# Patient Record
Sex: MALE | Race: WHITE | Employment: OTHER | URBAN - METROPOLITAN AREA
[De-identification: names, ages, dates, MRNs, and addresses within clinical notes are randomized per-mention and may not be internally consistent; named-entity substitution may affect disease eponyms.]

---

## 2022-08-29 ENCOUNTER — OFFICE VISIT (OUTPATIENT)
Dept: NEUROSURGERY | Age: 72
End: 2022-08-29
Payer: MEDICARE

## 2022-08-29 VITALS
DIASTOLIC BLOOD PRESSURE: 57 MMHG | WEIGHT: 315 LBS | RESPIRATION RATE: 18 BRPM | OXYGEN SATURATION: 98 % | HEART RATE: 68 BPM | HEIGHT: 70 IN | TEMPERATURE: 98.1 F | BODY MASS INDEX: 45.1 KG/M2 | SYSTOLIC BLOOD PRESSURE: 135 MMHG

## 2022-08-29 DIAGNOSIS — M54.12 CERVICAL RADICULOPATHY: ICD-10-CM

## 2022-08-29 DIAGNOSIS — M48.062 SPINAL STENOSIS OF LUMBAR REGION WITH NEUROGENIC CLAUDICATION: Primary | ICD-10-CM

## 2022-08-29 PROCEDURE — 4004F PT TOBACCO SCREEN RCVD TLK: CPT | Performed by: PHYSICIAN ASSISTANT

## 2022-08-29 PROCEDURE — 99204 OFFICE O/P NEW MOD 45 MIN: CPT | Performed by: PHYSICIAN ASSISTANT

## 2022-08-29 PROCEDURE — 1123F ACP DISCUSS/DSCN MKR DOCD: CPT | Performed by: PHYSICIAN ASSISTANT

## 2022-08-29 PROCEDURE — G8419 CALC BMI OUT NRM PARAM NOF/U: HCPCS | Performed by: PHYSICIAN ASSISTANT

## 2022-08-29 PROCEDURE — G8427 DOCREV CUR MEDS BY ELIG CLIN: HCPCS | Performed by: PHYSICIAN ASSISTANT

## 2022-08-29 PROCEDURE — 3017F COLORECTAL CA SCREEN DOC REV: CPT | Performed by: PHYSICIAN ASSISTANT

## 2022-08-29 PROCEDURE — 99202 OFFICE O/P NEW SF 15 MIN: CPT

## 2022-08-29 RX ORDER — PREDNISOLONE ACETATE 10 MG/ML
SUSPENSION/ DROPS OPHTHALMIC
COMMUNITY
Start: 2022-08-10

## 2022-08-29 RX ORDER — CITALOPRAM 20 MG/1
TABLET ORAL
COMMUNITY
Start: 2022-08-04

## 2022-08-29 RX ORDER — ALLOPURINOL 300 MG/1
TABLET ORAL
COMMUNITY
Start: 2022-08-04

## 2022-08-29 RX ORDER — ATENOLOL 50 MG/1
TABLET ORAL
COMMUNITY
Start: 2022-08-02

## 2022-08-29 RX ORDER — TRIAMCINOLONE ACETONIDE 1 MG/G
CREAM TOPICAL
COMMUNITY
Start: 2022-07-25

## 2022-08-29 RX ORDER — SODIUM FLUORIDE AND POTASSIUM NITRATE 5.8; 57.5 MG/ML; MG/ML
GEL, DENTIFRICE DENTAL
COMMUNITY
Start: 2022-07-25

## 2022-08-29 RX ORDER — GABAPENTIN 300 MG/1
CAPSULE ORAL
COMMUNITY
Start: 2022-08-04

## 2022-08-29 RX ORDER — ATORVASTATIN CALCIUM 40 MG/1
TABLET, FILM COATED ORAL
COMMUNITY
Start: 2022-06-21

## 2022-08-29 RX ORDER — LOSARTAN POTASSIUM AND HYDROCHLOROTHIAZIDE 25; 100 MG/1; MG/1
TABLET ORAL
COMMUNITY
Start: 2022-07-02

## 2022-08-29 ASSESSMENT — ENCOUNTER SYMPTOMS
SHORTNESS OF BREATH: 0
PHOTOPHOBIA: 0
TROUBLE SWALLOWING: 0
ABDOMINAL PAIN: 0
BACK PAIN: 1

## 2022-08-29 NOTE — PROGRESS NOTES
Subjective:      Patient ID: Ioana Garcia is a 81 y.o. male. Kimberly Rodriguez is a 81year old male with a past medical history of Afib for which he takes Eliquis, obesity with BMI 46, previous lumbar laminectomy in 1999, and spinal cord stimulator placement in 2018. SCS is MRI compatible. He presents to the office today as a new patient c/o low back pain with radiation down both legs, right greater than left. Describes the pain as a dull ache and intermittently sharp, shooting pain. Pain has been present for several years, but has recently been worsening. Walking and standing for long periods of time makes the pain worse. Ambulates with a cane. He has tried gabapentin, physical therapy, and received lumbar JEWELL by Dr. Viviana Ramirez for which his leg pain has improved. Patient is also c/o neck pain with radiation into his arms. Numbness is also noted in both hands. Denies loss of bowel or bladder, saddle anesthesia, headache, loss of dexterity, abnormal gait, fever, chills, N/V, SOB, or chest pain. Off note, pt chews tobacco daily. MRI lumbar spine 7/21/21 report only states moderate to severe spinal canal stenosis and foraminal stenosis from L2-S1. Review of Systems   Constitutional:  Negative for fever and unexpected weight change. HENT:  Negative for trouble swallowing. Eyes:  Negative for photophobia and visual disturbance. Respiratory:  Negative for shortness of breath. Cardiovascular:  Negative for chest pain. Gastrointestinal:  Negative for abdominal pain. Endocrine: Negative for heat intolerance. Genitourinary:  Negative for flank pain. Musculoskeletal:  Positive for arthralgias, back pain, gait problem and neck pain. Negative for myalgias. Skin:  Negative for wound. Neurological:  Positive for weakness and numbness. Negative for headaches. Psychiatric/Behavioral:  Negative for confusion.       Objective:   Physical Exam  Constitutional:       Appearance: He is well-developed. He is obese. HENT:      Head: Normocephalic and atraumatic. Eyes:      Extraocular Movements: Extraocular movements intact. Conjunctiva/sclera: Conjunctivae normal.      Pupils: Pupils are equal, round, and reactive to light. Neck:      Comments: Pain with ROM and palpation of cervical spine  Cardiovascular:      Rate and Rhythm: Normal rate. Pulmonary:      Effort: Pulmonary effort is normal.   Abdominal:      General: There is no distension. Musculoskeletal:      Cervical back: Neck supple. Comments: Diffuse tenderness to palpation of lumbar spine   Skin:     General: Skin is warm and dry. Neurological:      Mental Status: He is alert. Comments: Alert and oriented x3  CN3-12 intact  BLE 4/5 strength   BUE 5/5  Sensation intact to light touch  No Hoffmans   Psychiatric:         Thought Content: Thought content normal.       Assessment: This is a 81year old male presenting for low back pain, leg pain, and neck pain. MRI from 2021 report only states severe stenosis from L2-S1. Plan:      -Obtain updated MRI cervical and lumbar spine. Pt states he will need under anesthesia. SCS is compatible.  Rep will be notified prior to MRI.  -Tobacco cessation  -Weight loss  -Continue pain management  -OARRS report reviewed   -Return to Neurosurgery clinic after completion of imaging to discuss results and further treatment plan  -Call/return sooner if symptoms worsen or new issues arise in the interim           Sirisha Salinas PA-C

## 2022-09-01 ENCOUNTER — TELEPHONE (OUTPATIENT)
Dept: NEUROSURGERY | Age: 72
End: 2022-09-01

## 2022-09-01 NOTE — TELEPHONE ENCOUNTER
Patient's wife called regarding orders for MRI. It shows we ordered cervical spine and lumbar spine on 8/29/22 by Ashley Lagunas. She wants to see if we need to order thoracic also.

## 2022-09-01 NOTE — TELEPHONE ENCOUNTER
No need for MRI thoracic spine. I was only going to order lumbar spine (since that's what he presented for) but was also having neck pain/arm pain. I do not feel he is having any thoracic issues.

## 2022-09-12 ENCOUNTER — TELEPHONE (OUTPATIENT)
Dept: NEUROSURGERY | Age: 72
End: 2022-09-12

## 2022-09-12 DIAGNOSIS — F41.9 ANXIETY: Primary | ICD-10-CM

## 2022-09-12 RX ORDER — DIAZEPAM 5 MG/1
5 TABLET ORAL ONCE
Qty: 2 TABLET | Refills: 0 | Status: SHIPPED | OUTPATIENT
Start: 2022-09-12 | End: 2022-09-12

## 2022-09-12 NOTE — TELEPHONE ENCOUNTER
Patient has to go to mobile MRI. He wants medication to relax him. He is not able to go to regular MRI and be sedated. He says it is due to his size.   [phone]

## 2022-10-04 ENCOUNTER — TELEPHONE (OUTPATIENT)
Dept: NEUROSURGERY | Age: 72
End: 2022-10-04

## 2022-10-04 DIAGNOSIS — F06.4 ANXIETY DISORDER DUE TO KNOWN PHYSIOLOGICAL CONDITION: Primary | ICD-10-CM

## 2022-10-04 RX ORDER — DIAZEPAM 10 MG/1
10 TABLET ORAL ONCE
Qty: 1 TABLET | Refills: 0 | Status: SHIPPED | OUTPATIENT
Start: 2022-10-04 | End: 2022-10-04

## 2022-10-04 NOTE — TELEPHONE ENCOUNTER
Patient is having a Mri on 10/13/22 and needs a rx for medication to help relax him. He prefers Valium. [facility]  in [place] inside of [facility].

## 2022-10-10 ENCOUNTER — HOSPITAL ENCOUNTER (OUTPATIENT)
Dept: MRI IMAGING | Age: 72
Discharge: HOME | End: 2022-10-12

## 2022-10-10 DIAGNOSIS — M48.062 SPINAL STENOSIS OF LUMBAR REGION WITH NEUROGENIC CLAUDICATION: ICD-10-CM

## 2022-10-26 ENCOUNTER — TELEPHONE (OUTPATIENT)
Dept: NEUROSURGERY | Age: 72
End: 2022-10-26

## 2022-10-26 DIAGNOSIS — F41.9 ANXIETY: Primary | ICD-10-CM

## 2022-10-26 RX ORDER — DIAZEPAM 5 MG/1
5 TABLET ORAL ONCE
Qty: 1 TABLET | Refills: 0 | Status: SHIPPED | OUTPATIENT
Start: 2022-10-26 | End: 2022-10-26

## 2022-10-26 NOTE — TELEPHONE ENCOUNTER
Patient contacted office to advise that he is scheduled for his MRI's on 11.03.2022 however they have him scheduled for the mobile due to his weight and they will not be doing it under sedation; patient is requesting medication.   Please advise

## 2022-11-03 ENCOUNTER — HOSPITAL ENCOUNTER (OUTPATIENT)
Dept: MRI IMAGING | Age: 72
Discharge: HOME | End: 2022-11-05
Payer: MEDICARE

## 2022-11-03 DIAGNOSIS — M54.12 CERVICAL RADICULOPATHY: ICD-10-CM

## 2022-11-03 PROCEDURE — 72141 MRI NECK SPINE W/O DYE: CPT

## 2022-11-04 DIAGNOSIS — M48.062 SPINAL STENOSIS OF LUMBAR REGION WITH NEUROGENIC CLAUDICATION: ICD-10-CM

## 2022-11-04 DIAGNOSIS — F40.240 CLAUSTROPHOBIA: Primary | ICD-10-CM

## 2022-11-04 DIAGNOSIS — Z98.890 HX OF DECOMPRESSIVE LUMBAR LAMINECTOMY: ICD-10-CM

## 2022-11-04 RX ORDER — DIAZEPAM 5 MG/1
5 TABLET ORAL
Qty: 1 TABLET | Refills: 0 | Status: SHIPPED | OUTPATIENT
Start: 2022-11-04 | End: 2022-11-05

## 2022-12-15 ENCOUNTER — HOSPITAL ENCOUNTER (OUTPATIENT)
Dept: MRI IMAGING | Age: 72
Discharge: HOME | End: 2022-12-17
Payer: MEDICARE

## 2022-12-15 DIAGNOSIS — Z98.890 HX OF DECOMPRESSIVE LUMBAR LAMINECTOMY: ICD-10-CM

## 2022-12-15 DIAGNOSIS — M48.062 SPINAL STENOSIS OF LUMBAR REGION WITH NEUROGENIC CLAUDICATION: ICD-10-CM

## 2022-12-15 PROCEDURE — 72148 MRI LUMBAR SPINE W/O DYE: CPT

## 2022-12-27 ENCOUNTER — OFFICE VISIT (OUTPATIENT)
Dept: NEUROSURGERY | Age: 72
End: 2022-12-27
Payer: MEDICARE

## 2022-12-27 VITALS
HEART RATE: 75 BPM | RESPIRATION RATE: 18 BRPM | DIASTOLIC BLOOD PRESSURE: 62 MMHG | HEIGHT: 70 IN | BODY MASS INDEX: 45.1 KG/M2 | SYSTOLIC BLOOD PRESSURE: 126 MMHG | OXYGEN SATURATION: 98 % | WEIGHT: 315 LBS | TEMPERATURE: 98.2 F

## 2022-12-27 DIAGNOSIS — M48.062 SPINAL STENOSIS OF LUMBAR REGION WITH NEUROGENIC CLAUDICATION: Primary | ICD-10-CM

## 2022-12-27 DIAGNOSIS — M54.12 CERVICAL RADICULOPATHY: ICD-10-CM

## 2022-12-27 PROCEDURE — 99213 OFFICE O/P EST LOW 20 MIN: CPT | Performed by: PHYSICIAN ASSISTANT

## 2022-12-27 PROCEDURE — G8417 CALC BMI ABV UP PARAM F/U: HCPCS | Performed by: PHYSICIAN ASSISTANT

## 2022-12-27 PROCEDURE — 1123F ACP DISCUSS/DSCN MKR DOCD: CPT | Performed by: PHYSICIAN ASSISTANT

## 2022-12-27 PROCEDURE — G8484 FLU IMMUNIZE NO ADMIN: HCPCS | Performed by: PHYSICIAN ASSISTANT

## 2022-12-27 PROCEDURE — 4004F PT TOBACCO SCREEN RCVD TLK: CPT | Performed by: PHYSICIAN ASSISTANT

## 2022-12-27 PROCEDURE — 99212 OFFICE O/P EST SF 10 MIN: CPT

## 2022-12-27 PROCEDURE — 3017F COLORECTAL CA SCREEN DOC REV: CPT | Performed by: PHYSICIAN ASSISTANT

## 2022-12-27 PROCEDURE — G8427 DOCREV CUR MEDS BY ELIG CLIN: HCPCS | Performed by: PHYSICIAN ASSISTANT

## 2022-12-27 NOTE — PROGRESS NOTES
Office Follow-up     Subjective: Pb Olivier is a 81year old male with a past medical history of Afib for which he takes Eliquis, obesity with BMI 46, previous lumbar laminectomy in 1999, and spinal cord stimulator placement in 2018. SCS is MRI compatible. He initially presented to the office 8/29/22 c/o low back pain with radiation down both legs, right greater than left. Describes the pain as a dull ache and intermittently sharp, shooting pain. Pain has been present for several years, but has recently been worsening. Walking and standing for long periods of time makes the pain worse. Ambulates with a cane. He has tried gabapentin, physical therapy, and received lumbar JEWELL by Dr. Vivi Randall for which his leg pain has improved. Patient is also c/o neck pain with radiation into his arms. Numbness is also noted in both hands. Updated MRIs ordered at that time. Patient presents to the office today to review imaging. Symptoms remain the same, no better or worse. Denies loss of bowel or bladder, saddle anesthesia, headache, loss of dexterity, abnormal gait, fever, chills, N/V, SOB, or chest pain. Off note, pt chews tobacco daily. Physical Exam:              WDWN, no apparent distress              Non-labored breathing               Vitals Stable              Alert and oriented x3              CN 3-12 intact              PERRL              EOMI              BLE 4/5 strength   BUE 5/5              Sensation to LT intact bilaterally   Pain with ROM and palpation of cervical spine   Diffuse tenderness to palpation of lumbar spine                 Imaging: MRI lumbar spine 12/15/22:   Impression   1. No fracture or bony destructive lesion.    2. Degenerative changes superimposed on a developmentally narrow central   canal result in severe central canal stenosis at L3-4 and moderate stenoses   at L2-3 and L5-S1.   3.  Multilevel neural foraminal stenoses, worst (severe) at the left L3-4,   left L4-5 and left L5-S1 levels. 4. Marrow edema in the right L4-5 facet and the right L4 pedicle likely to   arthrosis. Posteriorly projecting synovial cyst arising from the right L4-5   facet joint. 5. Status post hemilaminectomies at L4-5. MRI cervical spine 11/3/22:   Impression   1. No fracture or bony destructive lesion. 2. Disc bulges and disc osteophyte complexes at multiple levels, coupled with   a central disc extrusion at C5-6.   3. Moderate central canal stenosis at C5-6. Mild stenoses at C3-4 and C4-5.   4.  Multilevel neural foraminal stenoses, worst (moderate to severe) at the   left C3-4 level. Moderate neural foraminal stenoses at multiple levels. Assessment: This is a 81 y.o.  male presenting for a follow-up to review imaging. Plan:  -MRIs reviewed and discussed in detail with patient. He continues to see pain management but feel the injections are no longer helping  -Tobacco cessation  -Lumbar flex/ex XR ordered  -Weight loss  -Once patient is 2-3 weeks tobacco free, call our office to make appointment with Dr. Talib Laguerre to discuss surgical intervention   -OARRS report reviewed   -Call or return to neurosurgery office sooner if symptoms worsen or if new issues arise in the interim.     Electronically signed by Juan José Noonan PA-C on 12/27/2022 at 4:08 PM

## 2023-08-18 ENCOUNTER — HOSPITAL ENCOUNTER (EMERGENCY)
Age: 73
Discharge: HOME | End: 2023-08-18
Payer: MEDICARE

## 2023-08-18 VITALS
HEART RATE: 108 BPM | SYSTOLIC BLOOD PRESSURE: 117 MMHG | RESPIRATION RATE: 18 BRPM | OXYGEN SATURATION: 95 % | DIASTOLIC BLOOD PRESSURE: 50 MMHG | TEMPERATURE: 98.6 F | WEIGHT: 300 LBS | BODY MASS INDEX: 43.05 KG/M2

## 2023-08-18 DIAGNOSIS — M25.559 HIP PAIN, UNSPECIFIED LATERALITY: ICD-10-CM

## 2023-08-18 DIAGNOSIS — S61.218A LACERATION OF LITTLE FINGER WITHOUT FOREIGN BODY WITHOUT DAMAGE TO NAIL, UNSPECIFIED LATERALITY, INITIAL ENCOUNTER: Primary | ICD-10-CM

## 2023-08-18 PROCEDURE — 99211 OFF/OP EST MAY X REQ PHY/QHP: CPT

## 2023-08-18 PROCEDURE — 6360000002 HC RX W HCPCS: Performed by: NURSE PRACTITIONER

## 2023-08-18 PROCEDURE — 90471 IMMUNIZATION ADMIN: CPT | Performed by: NURSE PRACTITIONER

## 2023-08-18 PROCEDURE — 90715 TDAP VACCINE 7 YRS/> IM: CPT | Performed by: NURSE PRACTITIONER

## 2023-08-18 RX ORDER — CEPHALEXIN 500 MG/1
500 CAPSULE ORAL 4 TIMES DAILY
Qty: 20 CAPSULE | Refills: 0 | Status: SHIPPED | OUTPATIENT
Start: 2023-08-18 | End: 2023-08-23

## 2023-08-18 RX ORDER — TRAMADOL HYDROCHLORIDE 50 MG/1
50 TABLET ORAL EVERY 6 HOURS PRN
Qty: 12 TABLET | Refills: 0 | Status: SHIPPED | OUTPATIENT
Start: 2023-08-18 | End: 2023-08-21

## 2023-08-18 RX ORDER — HYDROCHLOROTHIAZIDE 25 MG/1
25 TABLET ORAL DAILY
COMMUNITY

## 2023-08-18 RX ADMIN — TETANUS TOXOID, REDUCED DIPHTHERIA TOXOID AND ACELLULAR PERTUSSIS VACCINE, ADSORBED 0.5 ML: 5; 2.5; 8; 8; 2.5 SUSPENSION INTRAMUSCULAR at 12:34

## 2023-08-18 ASSESSMENT — PAIN SCALES - GENERAL: PAINLEVEL_OUTOF10: 8

## 2023-08-18 ASSESSMENT — PAIN - FUNCTIONAL ASSESSMENT: PAIN_FUNCTIONAL_ASSESSMENT: 0-10

## 2023-08-18 NOTE — ED PROVIDER NOTES
Department of Emergency  [facility] [address]   Provider Note  Admit Date/Time: 2023 11:57 AM  Room:   NAME: Heaven Grace  : 1950  MRN: [id number]     Chief Complaint:  Fall (Pt stated that he fell yesterday. Laceration noted to left pinky finger. Pt stated that he is having increase back pain. )    History of Present Illness        Heaven Grace is a 72 y.o. male who has a past medical history of:   Past Medical History:   Diagnosis Date    Arthritis     Atrial fibrillation ([address])     COPD (chronic obstructive pulmonary disease) ([address])     Depression     Gout     Hyperlipidemia     Hypertension     presents to the urgent care center by private car for evaluation. He fell yesterday has a laceration to his left fifth finger and also pain in the right buttock. He has chronic back problems those are unchanged his wife said. Not complaining of any pain over the lumbar spine he said it is more in the right buttock. He is on an anticoagulant but the last time he had an EKG he was not in A- fib and so he did stop taking the eliquis yesterday. Denies hitting his head he denies any neck pain. He said he has chronic pain in his back and legs and that is unchanged and he has difficulty with ambulation and getting around because of his pain he missed a step when he was going down the steps and fell yesterday. ROS    Pertinent positives and negatives are stated within HPI, all other systems reviewed and are negative. History reviewed. No pertinent surgical history. Social History:  reports that he quit smoking about 3 years ago. His smoking use included cigarettes. He has a 54.00 pack-year smoking history. His smokeless tobacco use includes chew. He reports current alcohol use of about 6.0 standard drinks per week. He reports that he does not use drugs. Family History: family history is not on file.   Allergies: Protonix [pantoprazole]    Physical Exam   Oxygen happened yesterday there is no repair that can be done he does have some swelling of the fleshly part of the pad. He also did not want that x-rayed. He was given a tetanus shot since he did not know when his last one was. I did irrigate the wound with normal saline will close it with Gelfoam and a dressing. I advised him to leave that in place for 2 or 3 days and he is on Eliquis if there is any bleeding through the dressing they need to go to the ED but the wife said it has not really been bleeding a lot. I did put him on some Keflex. He denies that there was any head injury he denies any headache or neck pain. I did advise follow-up with his Dr for a wound check. His heart rate initially was 108 when he came in when I auscultated his lung sounds he was regular and the recheck heart rate was 94. He does not have any chest pain or shortness of breath. Assessment      1. Laceration of little finger without foreign body without damage to nail, unspecified laterality, initial encounter    2. Hip pain, unspecified laterality      Plan   Discharge to home and advised to contact Talia Anderson MD  [facility] [phone]    Schedule an appointment as soon as possible for a visit   For wound re-check   Patient condition is good    New Medications     New Prescriptions    CEPHALEXIN (KEFLEX) 500 MG CAPSULE    Take 1 capsule by mouth 4 times daily for 5 days     Electronically signed by BRIELLE Brush CNP   DD: 8/18/23  **This report was transcribed using voice recognition software. Every effort was made to ensure accuracy; however, inadvertent computerized transcription errors may be present.   END OF ED PROVIDER NOTE       BRIELLE Brush CNP  08/18/23 1240

## 2023-12-29 ENCOUNTER — HOSPITAL ENCOUNTER (EMERGENCY)
Age: 73
Discharge: HOME | End: 2023-12-29
Payer: MEDICARE

## 2023-12-29 VITALS
WEIGHT: 285 LBS | BODY MASS INDEX: 40.89 KG/M2 | SYSTOLIC BLOOD PRESSURE: 142 MMHG | RESPIRATION RATE: 20 BRPM | DIASTOLIC BLOOD PRESSURE: 69 MMHG | TEMPERATURE: 98.1 F | HEART RATE: 71 BPM | OXYGEN SATURATION: 100 %

## 2023-12-29 DIAGNOSIS — H10.9 BACTERIAL CONJUNCTIVITIS OF LEFT EYE: Primary | ICD-10-CM

## 2023-12-29 PROCEDURE — 99211 OFF/OP EST MAY X REQ PHY/QHP: CPT

## 2023-12-29 RX ORDER — CIPROFLOXACIN HYDROCHLORIDE 3.5 MG/ML
1 SOLUTION/ DROPS TOPICAL
Qty: 10 ML | Refills: 0 | Status: SHIPPED | OUTPATIENT
Start: 2023-12-29 | End: 2024-01-08

## 2023-12-29 ASSESSMENT — PAIN DESCRIPTION - LOCATION: LOCATION: EYE

## 2023-12-29 ASSESSMENT — PAIN - FUNCTIONAL ASSESSMENT: PAIN_FUNCTIONAL_ASSESSMENT: 0-10

## 2023-12-29 ASSESSMENT — PAIN DESCRIPTION - ORIENTATION: ORIENTATION: LEFT

## 2023-12-29 ASSESSMENT — PAIN SCALES - GENERAL: PAINLEVEL_OUTOF10: 4

## 2023-12-29 ASSESSMENT — PAIN DESCRIPTION - DESCRIPTORS: DESCRIPTORS: DISCOMFORT

## 2023-12-29 NOTE — DISCHARGE INSTRUCTIONS
Cipro eyedrops 1 drop to the left eye every 2 hours while awake. Please follow-up with ophthalmology.

## 2024-06-21 ENCOUNTER — HOSPITAL ENCOUNTER (EMERGENCY)
Age: 74
Discharge: HOME | End: 2024-06-21
Payer: MEDICARE

## 2024-06-21 VITALS
TEMPERATURE: 99 F | RESPIRATION RATE: 20 BRPM | DIASTOLIC BLOOD PRESSURE: 57 MMHG | BODY MASS INDEX: 40.89 KG/M2 | WEIGHT: 285 LBS | OXYGEN SATURATION: 98 % | HEART RATE: 77 BPM | SYSTOLIC BLOOD PRESSURE: 118 MMHG

## 2024-06-21 DIAGNOSIS — M25.50 ARTHRALGIA, UNSPECIFIED JOINT: Primary | ICD-10-CM

## 2024-06-21 PROCEDURE — 99211 OFF/OP EST MAY X REQ PHY/QHP: CPT

## 2024-06-21 PROCEDURE — 6360000002 HC RX W HCPCS: Performed by: PHYSICIAN ASSISTANT

## 2024-06-21 RX ORDER — TRAMADOL HYDROCHLORIDE 50 MG/1
50 TABLET ORAL EVERY 6 HOURS PRN
COMMUNITY

## 2024-06-21 RX ORDER — METHOCARBAMOL 750 MG/1
750 TABLET, FILM COATED ORAL 4 TIMES DAILY
COMMUNITY

## 2024-06-21 RX ORDER — TRIAMCINOLONE ACETONIDE 40 MG/ML
40 INJECTION, SUSPENSION INTRA-ARTICULAR; INTRAMUSCULAR ONCE
Status: COMPLETED | OUTPATIENT
Start: 2024-06-21 | End: 2024-06-21

## 2024-06-21 RX ORDER — PREDNISONE 20 MG/1
TABLET ORAL
Qty: 6 TABLET | Refills: 0 | Status: SHIPPED | OUTPATIENT
Start: 2024-06-22

## 2024-06-21 RX ADMIN — TRIAMCINOLONE ACETONIDE 40 MG: 40 INJECTION, SUSPENSION INTRA-ARTICULAR; INTRAMUSCULAR at 14:48

## 2024-06-21 NOTE — ED PROVIDER NOTES
[facility]RGENT [facility]ARE  EMERGENCY DEPARTMENT ENCOUNTER        NAME: Isaias Sierra  :  1950  MRN:  [id number]  Date of evaluation: 2024  Provider: Teodoro Camacho PA-C  PCP: Miranda Ramos MD  Note Started : 2:36 PM EDT 24    Chief Complaint: Ankle Pain (Gout flare up, right foot/ankle) and Gout (Started last night)      This is a 74-year-old male that presents to urgent care complaining of right ankle and right knee pain for the past couple days.  He has a history of gout he is on allopurinol.  He denies any recent injury.  No numbness or tingling.  On first contact patient he appears to be in no acute distress.        Review of Systems  Pertinent positives and negatives are stated within HPI, all other systems reviewed and are negative.     Allergies: Protonix [pantoprazole]     --------------------------------------------- PAST HISTORY ---------------------------------------------  Past Medical History:  has a past medical history of Arthritis, Atrial fibrillation (HCC), COPD (chronic obstructive pulmonary disease) (HCC), Depression, Gout, Hyperlipidemia, and Hypertension.    Past Surgical History:  has a past surgical history that includes back surgery.    Social History:  reports that he quit smoking about 4 years ago. His smoking use included cigarettes. He started smoking about 58 years ago. He has a 54.0 pack-year smoking history. His smokeless tobacco use includes chew. He reports current alcohol use of about 6.0 standard drinks of alcohol per week. He reports that he does not use drugs.    Family History: family history is not on file.     The patient’s home medications have been reviewed.    The nursing notes within the ED encounter have been reviewed.     ------------------------------------------------SCREENINGS----------------------------------------------                        CIWA Assessment  BP: (!) 118/57  Pulse: 77        (Please note that portions of this note were completed with a voice recognition program.  Efforts were made to edit the dictations but occasionally words are mis-transcribed.)    --------------------------------- IMPRESSION AND DISPOSITION ---------------------------------    IMPRESSION  1. Arthralgia, unspecified joint        DISPOSITION Decision To Discharge 06/21/2024 02:45:27 PM    Disposition: Discharge to home  Patient condition is good    I am the Primary Clinician of Record.     Teodoro Camacho PA-C (electronically signed) on 6/21/2024 at 2:50 PM         Teodoro Camacho PA-C  06/21/24 1450

## 2024-07-06 ENCOUNTER — APPOINTMENT (OUTPATIENT)
Dept: CT IMAGING | Age: 74
End: 2024-07-06
Attending: STUDENT IN AN ORGANIZED HEALTH CARE EDUCATION/TRAINING PROGRAM
Payer: MEDICARE

## 2024-07-06 ENCOUNTER — APPOINTMENT (OUTPATIENT)
Dept: GENERAL RADIOLOGY | Age: 74
End: 2024-07-06
Payer: MEDICARE

## 2024-07-06 ENCOUNTER — HOSPITAL ENCOUNTER (EMERGENCY)
Age: 74
Discharge: HOME | End: 2024-07-06
Attending: STUDENT IN AN ORGANIZED HEALTH CARE EDUCATION/TRAINING PROGRAM
Payer: MEDICARE

## 2024-07-06 VITALS
SYSTOLIC BLOOD PRESSURE: 142 MMHG | TEMPERATURE: 98.3 F | HEART RATE: 83 BPM | OXYGEN SATURATION: 97 % | DIASTOLIC BLOOD PRESSURE: 66 MMHG | RESPIRATION RATE: 18 BRPM

## 2024-07-06 DIAGNOSIS — M54.31 SCIATICA OF RIGHT SIDE: Primary | ICD-10-CM

## 2024-07-06 LAB
ALBUMIN SERPL-MCNC: 4 G/DL (ref 3.5–5.2)
ALP SERPL-CCNC: 62 U/L (ref 40–129)
ALT SERPL-CCNC: 18 U/L (ref 0–40)
ANION GAP SERPL CALCULATED.3IONS-SCNC: 10 MMOL/L (ref 7–16)
AST SERPL-CCNC: 20 U/L (ref 0–39)
BASOPHILS # BLD: 0.02 K/UL (ref 0–0.2)
BASOPHILS NFR BLD: 0 % (ref 0–2)
BILIRUB SERPL-MCNC: 0.4 MG/DL (ref 0–1.2)
BUN SERPL-MCNC: 24 MG/DL (ref 6–23)
CALCIUM SERPL-MCNC: 9.1 MG/DL (ref 8.6–10.2)
CHLORIDE SERPL-SCNC: 105 MMOL/L (ref 98–107)
CO2 SERPL-SCNC: 23 MMOL/L (ref 22–29)
CREAT SERPL-MCNC: 1 MG/DL (ref 0.7–1.2)
EOSINOPHIL # BLD: 0.34 K/UL (ref 0.05–0.5)
EOSINOPHILS RELATIVE PERCENT: 4 % (ref 0–6)
ERYTHROCYTE [DISTWIDTH] IN BLOOD BY AUTOMATED COUNT: 13.7 % (ref 11.5–15)
ERYTHROCYTE [SEDIMENTATION RATE] IN BLOOD BY WESTERGREN METHOD: 42 MM/HR (ref 0–15)
GFR, ESTIMATED: 78 ML/MIN/1.73M2
GLUCOSE SERPL-MCNC: 129 MG/DL (ref 74–99)
HCT VFR BLD AUTO: 38.8 % (ref 37–54)
HGB BLD-MCNC: 12.1 G/DL (ref 12.5–16.5)
IMM GRANULOCYTES # BLD AUTO: <0.03 K/UL (ref 0–0.58)
IMM GRANULOCYTES NFR BLD: 0 % (ref 0–5)
LYMPHOCYTES NFR BLD: 1.53 K/UL (ref 1.5–4)
LYMPHOCYTES RELATIVE PERCENT: 20 % (ref 20–42)
MCH RBC QN AUTO: 28.7 PG (ref 26–35)
MCHC RBC AUTO-ENTMCNC: 31.2 G/DL (ref 32–34.5)
MCV RBC AUTO: 91.9 FL (ref 80–99.9)
MONOCYTES NFR BLD: 0.72 K/UL (ref 0.1–0.95)
MONOCYTES NFR BLD: 9 % (ref 2–12)
NEUTROPHILS NFR BLD: 66 % (ref 43–80)
NEUTS SEG NFR BLD: 5.08 K/UL (ref 1.8–7.3)
PLATELET # BLD AUTO: 267 K/UL (ref 130–450)
PMV BLD AUTO: 10.5 FL (ref 7–12)
POTASSIUM SERPL-SCNC: 4.3 MMOL/L (ref 3.5–5)
PROT SERPL-MCNC: 7.8 G/DL (ref 6.4–8.3)
RBC # BLD AUTO: 4.22 M/UL (ref 3.8–5.8)
SODIUM SERPL-SCNC: 138 MMOL/L (ref 132–146)
WBC OTHER # BLD: 7.7 K/UL (ref 4.5–11.5)

## 2024-07-06 PROCEDURE — 96372 THER/PROPH/DIAG INJ SC/IM: CPT

## 2024-07-06 PROCEDURE — 85652 RBC SED RATE AUTOMATED: CPT

## 2024-07-06 PROCEDURE — 73502 X-RAY EXAM HIP UNI 2-3 VIEWS: CPT

## 2024-07-06 PROCEDURE — 80053 COMPREHEN METABOLIC PANEL: CPT

## 2024-07-06 PROCEDURE — 6370000000 HC RX 637 (ALT 250 FOR IP): Performed by: STUDENT IN AN ORGANIZED HEALTH CARE EDUCATION/TRAINING PROGRAM

## 2024-07-06 PROCEDURE — 6360000002 HC RX W HCPCS: Performed by: STUDENT IN AN ORGANIZED HEALTH CARE EDUCATION/TRAINING PROGRAM

## 2024-07-06 PROCEDURE — 72131 CT LUMBAR SPINE W/O DYE: CPT

## 2024-07-06 PROCEDURE — 96374 THER/PROPH/DIAG INJ IV PUSH: CPT

## 2024-07-06 PROCEDURE — 99284 EMERGENCY DEPT VISIT MOD MDM: CPT

## 2024-07-06 PROCEDURE — 85025 COMPLETE CBC W/AUTO DIFF WBC: CPT

## 2024-07-06 RX ORDER — DIAZEPAM 5 MG/1
5 TABLET ORAL ONCE
Status: COMPLETED | OUTPATIENT
Start: 2024-07-06 | End: 2024-07-06

## 2024-07-06 RX ORDER — MORPHINE SULFATE 4 MG/ML
4 INJECTION, SOLUTION INTRAMUSCULAR; INTRAVENOUS ONCE
Status: DISCONTINUED | OUTPATIENT
Start: 2024-07-06 | End: 2024-07-06

## 2024-07-06 RX ORDER — METHOCARBAMOL 500 MG/1
500 TABLET, FILM COATED ORAL 4 TIMES DAILY
Qty: 40 TABLET | Refills: 0 | Status: SHIPPED | OUTPATIENT
Start: 2024-07-06 | End: 2024-07-16

## 2024-07-06 RX ORDER — MORPHINE SULFATE 4 MG/ML
4 INJECTION, SOLUTION INTRAMUSCULAR; INTRAVENOUS ONCE
Status: COMPLETED | OUTPATIENT
Start: 2024-07-06 | End: 2024-07-06

## 2024-07-06 RX ORDER — METHYLPREDNISOLONE 4 MG/1
TABLET ORAL
Qty: 1 KIT | Refills: 0 | Status: SHIPPED | OUTPATIENT
Start: 2024-07-06 | End: 2024-07-12

## 2024-07-06 RX ORDER — HYDROCODONE BITARTRATE AND ACETAMINOPHEN 5; 325 MG/1; MG/1
1 TABLET ORAL EVERY 6 HOURS PRN
Qty: 6 TABLET | Refills: 0 | Status: SHIPPED | OUTPATIENT
Start: 2024-07-06 | End: 2024-07-08

## 2024-07-06 RX ORDER — DEXAMETHASONE SODIUM PHOSPHATE 10 MG/ML
10 INJECTION INTRAMUSCULAR; INTRAVENOUS ONCE
Status: COMPLETED | OUTPATIENT
Start: 2024-07-06 | End: 2024-07-06

## 2024-07-06 RX ADMIN — DEXAMETHASONE SODIUM PHOSPHATE 10 MG: 10 INJECTION INTRAMUSCULAR; INTRAVENOUS at 16:10

## 2024-07-06 RX ADMIN — MORPHINE SULFATE 4 MG: 4 INJECTION, SOLUTION INTRAMUSCULAR; INTRAVENOUS at 13:53

## 2024-07-06 RX ADMIN — DIAZEPAM 5 MG: 5 TABLET ORAL at 13:45

## 2024-07-06 ASSESSMENT — PAIN DESCRIPTION - ORIENTATION
ORIENTATION: RIGHT;MID
ORIENTATION: RIGHT

## 2024-07-06 ASSESSMENT — PAIN SCALES - GENERAL
PAINLEVEL_OUTOF10: 7
PAINLEVEL_OUTOF10: 7

## 2024-07-06 ASSESSMENT — PAIN DESCRIPTION - LOCATION
LOCATION: BACK
LOCATION: HIP

## 2024-07-06 ASSESSMENT — PAIN - FUNCTIONAL ASSESSMENT: PAIN_FUNCTIONAL_ASSESSMENT: 0-10

## 2024-07-06 ASSESSMENT — PAIN DESCRIPTION - DESCRIPTORS: DESCRIPTORS: BURNING

## 2024-07-06 NOTE — DISCHARGE INSTRUCTIONS
Follow-up with primary care doctor  Follow-up with neurosurgery  If you notice any new worrisome symptoms please return to emergency department for evaluation

## 2024-08-12 ENCOUNTER — APPOINTMENT (OUTPATIENT)
Dept: CT IMAGING | Age: 74
End: 2024-08-12
Attending: EMERGENCY MEDICINE
Payer: MEDICARE

## 2024-08-12 ENCOUNTER — HOSPITAL ENCOUNTER (OUTPATIENT)
Age: 74
Setting detail: OBSERVATION
Discharge: HOME | End: 2024-08-14
Attending: EMERGENCY MEDICINE | Admitting: FAMILY MEDICINE
Payer: MEDICARE

## 2024-08-12 ENCOUNTER — APPOINTMENT (OUTPATIENT)
Dept: GENERAL RADIOLOGY | Age: 74
End: 2024-08-12
Payer: MEDICARE

## 2024-08-12 DIAGNOSIS — I65.02 VERTEBRAL ARTERY OCCLUSION, LEFT: ICD-10-CM

## 2024-08-12 DIAGNOSIS — I63.9 CEREBROVASCULAR ACCIDENT (CVA), UNSPECIFIED MECHANISM (HCC): ICD-10-CM

## 2024-08-12 DIAGNOSIS — I73.9 PVD (PERIPHERAL VASCULAR DISEASE) WITH CLAUDICATION (HCC): ICD-10-CM

## 2024-08-12 DIAGNOSIS — R42 DIZZINESS: Primary | ICD-10-CM

## 2024-08-12 LAB
ANION GAP SERPL CALCULATED.3IONS-SCNC: 11 MMOL/L (ref 7–16)
BASOPHILS # BLD: 0.03 K/UL (ref 0–0.2)
BASOPHILS NFR BLD: 0 % (ref 0–2)
BUN SERPL-MCNC: 20 MG/DL (ref 6–23)
CALCIUM SERPL-MCNC: 9.2 MG/DL (ref 8.6–10.2)
CHLORIDE SERPL-SCNC: 109 MMOL/L (ref 98–107)
CO2 SERPL-SCNC: 23 MMOL/L (ref 22–29)
CREAT SERPL-MCNC: 0.9 MG/DL (ref 0.7–1.2)
EOSINOPHIL # BLD: 0.25 K/UL (ref 0.05–0.5)
EOSINOPHILS RELATIVE PERCENT: 3 % (ref 0–6)
ERYTHROCYTE [DISTWIDTH] IN BLOOD BY AUTOMATED COUNT: 14.2 % (ref 11.5–15)
GFR, ESTIMATED: 86 ML/MIN/1.73M2
GLUCOSE SERPL-MCNC: 76 MG/DL (ref 74–99)
HCT VFR BLD AUTO: 39.9 % (ref 37–54)
HGB BLD-MCNC: 12.5 G/DL (ref 12.5–16.5)
IMM GRANULOCYTES # BLD AUTO: 0.05 K/UL (ref 0–0.58)
IMM GRANULOCYTES NFR BLD: 1 % (ref 0–5)
LYMPHOCYTES NFR BLD: 2.26 K/UL (ref 1.5–4)
LYMPHOCYTES RELATIVE PERCENT: 26 % (ref 20–42)
MCH RBC QN AUTO: 28.8 PG (ref 26–35)
MCHC RBC AUTO-ENTMCNC: 31.3 G/DL (ref 32–34.5)
MCV RBC AUTO: 91.9 FL (ref 80–99.9)
MONOCYTES NFR BLD: 0.92 K/UL (ref 0.1–0.95)
MONOCYTES NFR BLD: 11 % (ref 2–12)
NEUTROPHILS NFR BLD: 60 % (ref 43–80)
NEUTS SEG NFR BLD: 5.27 K/UL (ref 1.8–7.3)
PLATELET # BLD AUTO: 260 K/UL (ref 130–450)
PMV BLD AUTO: 10.9 FL (ref 7–12)
POTASSIUM SERPL-SCNC: 4 MMOL/L (ref 3.5–5)
RBC # BLD AUTO: 4.34 M/UL (ref 3.8–5.8)
SODIUM SERPL-SCNC: 143 MMOL/L (ref 132–146)
TROPONIN I SERPL HS-MCNC: 23 NG/L (ref 0–11)
TROPONIN I SERPL HS-MCNC: 24 NG/L (ref 0–11)
WBC OTHER # BLD: 8.8 K/UL (ref 4.5–11.5)

## 2024-08-12 PROCEDURE — 80048 BASIC METABOLIC PNL TOTAL CA: CPT

## 2024-08-12 PROCEDURE — 85025 COMPLETE CBC W/AUTO DIFF WBC: CPT

## 2024-08-12 PROCEDURE — 99222 1ST HOSP IP/OBS MODERATE 55: CPT | Performed by: FAMILY MEDICINE

## 2024-08-12 PROCEDURE — 6360000004 HC RX CONTRAST MEDICATION: Performed by: RADIOLOGY

## 2024-08-12 PROCEDURE — 6370000000 HC RX 637 (ALT 250 FOR IP): Performed by: EMERGENCY MEDICINE

## 2024-08-12 PROCEDURE — 70496 CT ANGIOGRAPHY HEAD: CPT

## 2024-08-12 PROCEDURE — 71046 X-RAY EXAM CHEST 2 VIEWS: CPT

## 2024-08-12 PROCEDURE — 84484 ASSAY OF TROPONIN QUANT: CPT

## 2024-08-12 PROCEDURE — 93005 ELECTROCARDIOGRAM TRACING: CPT | Performed by: EMERGENCY MEDICINE

## 2024-08-12 PROCEDURE — 2580000003 HC RX 258: Performed by: FAMILY MEDICINE

## 2024-08-12 PROCEDURE — 70498 CT ANGIOGRAPHY NECK: CPT

## 2024-08-12 PROCEDURE — 6370000000 HC RX 637 (ALT 250 FOR IP): Performed by: FAMILY MEDICINE

## 2024-08-12 PROCEDURE — G0378 HOSPITAL OBSERVATION PER HR: HCPCS

## 2024-08-12 PROCEDURE — 99285 EMERGENCY DEPT VISIT HI MDM: CPT

## 2024-08-12 RX ORDER — ASPIRIN 81 MG/1
81 TABLET, CHEWABLE ORAL ONCE
Status: COMPLETED | OUTPATIENT
Start: 2024-08-12 | End: 2024-08-12

## 2024-08-12 RX ORDER — ASPIRIN 300 MG/1
300 SUPPOSITORY RECTAL DAILY
Status: DISCONTINUED | OUTPATIENT
Start: 2024-08-13 | End: 2024-08-14 | Stop reason: HOSPADM

## 2024-08-12 RX ORDER — ASPIRIN 81 MG/1
81 TABLET, CHEWABLE ORAL DAILY
Status: DISCONTINUED | OUTPATIENT
Start: 2024-08-13 | End: 2024-08-14 | Stop reason: HOSPADM

## 2024-08-12 RX ORDER — SODIUM CHLORIDE 9 MG/ML
INJECTION, SOLUTION INTRAVENOUS PRN
Status: DISCONTINUED | OUTPATIENT
Start: 2024-08-12 | End: 2024-08-14 | Stop reason: HOSPADM

## 2024-08-12 RX ORDER — SODIUM CHLORIDE 0.9 % (FLUSH) 0.9 %
5-40 SYRINGE (ML) INJECTION EVERY 12 HOURS SCHEDULED
Status: DISCONTINUED | OUTPATIENT
Start: 2024-08-12 | End: 2024-08-14 | Stop reason: HOSPADM

## 2024-08-12 RX ORDER — ONDANSETRON 4 MG/1
4 TABLET, ORALLY DISINTEGRATING ORAL EVERY 8 HOURS PRN
Status: DISCONTINUED | OUTPATIENT
Start: 2024-08-12 | End: 2024-08-14 | Stop reason: HOSPADM

## 2024-08-12 RX ORDER — SODIUM CHLORIDE 0.9 % (FLUSH) 0.9 %
5-40 SYRINGE (ML) INJECTION PRN
Status: DISCONTINUED | OUTPATIENT
Start: 2024-08-12 | End: 2024-08-14 | Stop reason: HOSPADM

## 2024-08-12 RX ORDER — ONDANSETRON 2 MG/ML
4 INJECTION INTRAMUSCULAR; INTRAVENOUS EVERY 6 HOURS PRN
Status: DISCONTINUED | OUTPATIENT
Start: 2024-08-12 | End: 2024-08-14 | Stop reason: HOSPADM

## 2024-08-12 RX ORDER — ENOXAPARIN SODIUM 100 MG/ML
30 INJECTION SUBCUTANEOUS 2 TIMES DAILY
Status: DISCONTINUED | OUTPATIENT
Start: 2024-08-12 | End: 2024-08-12

## 2024-08-12 RX ORDER — DIAZEPAM 5 MG/1
5 TABLET ORAL ONCE
Status: COMPLETED | OUTPATIENT
Start: 2024-08-12 | End: 2024-08-12

## 2024-08-12 RX ORDER — ATORVASTATIN CALCIUM 40 MG/1
80 TABLET, FILM COATED ORAL NIGHTLY
Status: DISCONTINUED | OUTPATIENT
Start: 2024-08-12 | End: 2024-08-14 | Stop reason: HOSPADM

## 2024-08-12 RX ORDER — POLYETHYLENE GLYCOL 3350 17 G/17G
17 POWDER, FOR SOLUTION ORAL DAILY PRN
Status: DISCONTINUED | OUTPATIENT
Start: 2024-08-12 | End: 2024-08-14 | Stop reason: HOSPADM

## 2024-08-12 RX ADMIN — ATORVASTATIN CALCIUM 80 MG: 40 TABLET, FILM COATED ORAL at 22:40

## 2024-08-12 RX ADMIN — IOPAMIDOL 60 ML: 755 INJECTION, SOLUTION INTRAVENOUS at 20:00

## 2024-08-12 RX ADMIN — ASPIRIN 81 MG 81 MG: 81 TABLET ORAL at 22:25

## 2024-08-12 RX ADMIN — DIAZEPAM 5 MG: 5 TABLET ORAL at 17:15

## 2024-08-12 RX ADMIN — SODIUM CHLORIDE, PRESERVATIVE FREE 10 ML: 5 INJECTION INTRAVENOUS at 22:40

## 2024-08-12 ASSESSMENT — ENCOUNTER SYMPTOMS
EYE REDNESS: 0
ABDOMINAL PAIN: 0
NAUSEA: 0
VOMITING: 0
SHORTNESS OF BREATH: 0

## 2024-08-12 ASSESSMENT — PAIN - FUNCTIONAL ASSESSMENT
PAIN_FUNCTIONAL_ASSESSMENT: NONE - DENIES PAIN
PAIN_FUNCTIONAL_ASSESSMENT: NONE - DENIES PAIN

## 2024-08-13 ENCOUNTER — APPOINTMENT (OUTPATIENT)
Age: 74
End: 2024-08-13
Attending: FAMILY MEDICINE
Payer: MEDICARE

## 2024-08-13 PROBLEM — I65.02 OCCLUSION OF LEFT VERTEBRAL ARTERY: Status: ACTIVE | Noted: 2024-08-13

## 2024-08-13 PROBLEM — R09.89 DECREASED DORSALIS PEDIS PULSE: Status: ACTIVE | Noted: 2024-08-13

## 2024-08-13 PROBLEM — I65.02 VERTEBRAL ARTERY OCCLUSION, LEFT: Status: ACTIVE | Noted: 2024-08-13

## 2024-08-13 PROBLEM — I73.9 PVD (PERIPHERAL VASCULAR DISEASE) WITH CLAUDICATION (HCC): Status: ACTIVE | Noted: 2024-08-13

## 2024-08-13 PROBLEM — I65.22 LEFT CAROTID STENOSIS: Status: ACTIVE | Noted: 2024-08-13

## 2024-08-13 LAB
CHOLEST SERPL-MCNC: 132 MG/DL
ECHO AO ASC DIAM: 3.2 CM
ECHO AO ASCENDING AORTA INDEX: 1.36 CM/M2
ECHO AV AREA PEAK VELOCITY: 2.5 CM2
ECHO AV AREA VTI: 2.8 CM2
ECHO AV AREA/BSA PEAK VELOCITY: 1.1 CM2/M2
ECHO AV AREA/BSA VTI: 1.2 CM2/M2
ECHO AV CUSP MM: 2 CM
ECHO AV MEAN GRADIENT: 4 MMHG
ECHO AV MEAN VELOCITY: 0.9 M/S
ECHO AV PEAK GRADIENT: 8 MMHG
ECHO AV PEAK VELOCITY: 1.4 M/S
ECHO AV VELOCITY RATIO: 0.64
ECHO AV VTI: 27.6 CM
ECHO BSA: 2.45 M2
ECHO EST RA PRESSURE: 3 MMHG
ECHO LA DIAMETER INDEX: 1.31 CM/M2
ECHO LA DIAMETER: 3.1 CM
ECHO LA VOL A-L A2C: 42 ML (ref 18–58)
ECHO LA VOL A-L A4C: 38 ML (ref 18–58)
ECHO LA VOL BP: 38 ML (ref 18–58)
ECHO LA VOL MOD A2C: 40 ML (ref 18–58)
ECHO LA VOL MOD A4C: 36 ML (ref 18–58)
ECHO LA VOL/BSA BIPLANE: 16 ML/M2 (ref 16–34)
ECHO LA VOLUME AREA LENGTH: 41 ML
ECHO LA VOLUME INDEX A-L A2C: 18 ML/M2 (ref 16–34)
ECHO LA VOLUME INDEX A-L A4C: 16 ML/M2 (ref 16–34)
ECHO LA VOLUME INDEX AREA LENGTH: 17 ML/M2 (ref 16–34)
ECHO LA VOLUME INDEX MOD A2C: 17 ML/M2 (ref 16–34)
ECHO LA VOLUME INDEX MOD A4C: 15 ML/M2 (ref 16–34)
ECHO LV EDV A2C: 71 ML
ECHO LV EDV A4C: 90 ML
ECHO LV EDV BP: 85 ML (ref 67–155)
ECHO LV EDV INDEX A4C: 38 ML/M2
ECHO LV EDV INDEX BP: 36 ML/M2
ECHO LV EDV NDEX A2C: 30 ML/M2
ECHO LV EJECTION FRACTION A2C: 59 %
ECHO LV EJECTION FRACTION A4C: 50 %
ECHO LV EJECTION FRACTION BIPLANE: 55 % (ref 55–100)
ECHO LV ESV A2C: 30 ML
ECHO LV ESV A4C: 45 ML
ECHO LV ESV BP: 38 ML (ref 22–58)
ECHO LV ESV INDEX A2C: 13 ML/M2
ECHO LV ESV INDEX A4C: 19 ML/M2
ECHO LV ESV INDEX BP: 16 ML/M2
ECHO LV FRACTIONAL SHORTENING: 26 % (ref 28–44)
ECHO LV INTERNAL DIMENSION DIASTOLE INDEX: 2.12 CM/M2
ECHO LV INTERNAL DIMENSION DIASTOLIC: 5 CM (ref 4.2–5.9)
ECHO LV INTERNAL DIMENSION SYSTOLIC INDEX: 1.57 CM/M2
ECHO LV INTERNAL DIMENSION SYSTOLIC: 3.7 CM
ECHO LV ISOVOLUMETRIC RELAXATION TIME (IVRT): 87.5 MS
ECHO LV IVSD: 1.5 CM (ref 0.6–1)
ECHO LV IVSS: 1.8 CM
ECHO LV MASS 2D: 276.4 G (ref 88–224)
ECHO LV MASS INDEX 2D: 117.1 G/M2 (ref 49–115)
ECHO LV POSTERIOR WALL DIASTOLIC: 1.2 CM (ref 0.6–1)
ECHO LV POSTERIOR WALL SYSTOLIC: 1.2 CM
ECHO LV RELATIVE WALL THICKNESS RATIO: 0.48
ECHO LVOT AREA: 3.8 CM2
ECHO LVOT AV VTI INDEX: 0.75
ECHO LVOT DIAM: 2.2 CM
ECHO LVOT MEAN GRADIENT: 2 MMHG
ECHO LVOT PEAK GRADIENT: 3 MMHG
ECHO LVOT PEAK VELOCITY: 0.9 M/S
ECHO LVOT STROKE VOLUME INDEX: 33.2 ML/M2
ECHO LVOT SV: 78.3 ML
ECHO LVOT VTI: 20.6 CM
ECHO MV "A" WAVE DURATION: 133.2 MSEC
ECHO MV A VELOCITY: 0.7 M/S
ECHO MV AREA PHT: 2.5 CM2
ECHO MV AREA VTI: 3.8 CM2
ECHO MV E DECELERATION TIME (DT): 349.3 MS
ECHO MV E VELOCITY: 0.54 M/S
ECHO MV E/A RATIO: 0.77
ECHO MV LVOT VTI INDEX: 1
ECHO MV MAX VELOCITY: 0.8 M/S
ECHO MV MEAN GRADIENT: 1 MMHG
ECHO MV MEAN VELOCITY: 0.5 M/S
ECHO MV PEAK GRADIENT: 3 MMHG
ECHO MV PRESSURE HALF TIME (PHT): 87 MS
ECHO MV VTI: 20.7 CM
ECHO PV MAX VELOCITY: 0.9 M/S
ECHO PV MEAN GRADIENT: 2 MMHG
ECHO PV MEAN VELOCITY: 0.7 M/S
ECHO PV PEAK GRADIENT: 3 MMHG
ECHO PV VTI: 18.9 CM
ECHO PVEIN A DURATION: 110.4 MS
ECHO PVEIN A VELOCITY: 0.3 M/S
ECHO PVEIN PEAK D VELOCITY: 0.2 M/S
ECHO PVEIN PEAK S VELOCITY: 0.3 M/S
ECHO PVEIN S/D RATIO: 1.5
ECHO RV INTERNAL DIMENSION: 4.1 CM
ECHO RV LONGITUDINAL DIMENSION: 7.3 CM
ECHO RV MID DIMENSION: 2.7 CM
ECHO RV TAPSE: 1.7 CM (ref 1.7–?)
EKG ATRIAL RATE: 63 BPM
EKG P-R INTERVAL: 176 MS
EKG Q-T INTERVAL: 390 MS
EKG QRS DURATION: 82 MS
EKG QTC CALCULATION (BAZETT): 399 MS
EKG R AXIS: -22 DEGREES
EKG T AXIS: 27 DEGREES
EKG VENTRICULAR RATE: 63 BPM
ERYTHROCYTE [DISTWIDTH] IN BLOOD BY AUTOMATED COUNT: 14.4 % (ref 11.5–15)
HBA1C MFR BLD: 6.2 % (ref 4–5.6)
HCT VFR BLD AUTO: 44.7 % (ref 37–54)
HDLC SERPL-MCNC: 47 MG/DL
HGB BLD-MCNC: 14.5 G/DL (ref 12.5–16.5)
LDLC SERPL CALC-MCNC: 73 MG/DL
MCH RBC QN AUTO: 30.5 PG (ref 26–35)
MCHC RBC AUTO-ENTMCNC: 32.4 G/DL (ref 32–34.5)
MCV RBC AUTO: 94.1 FL (ref 80–99.9)
PLATELET # BLD AUTO: 288 K/UL (ref 130–450)
PMV BLD AUTO: 10.7 FL (ref 7–12)
RBC # BLD AUTO: 4.75 M/UL (ref 3.8–5.8)
TRIGL SERPL-MCNC: 61 MG/DL
VLDLC SERPL CALC-MCNC: 12 MG/DL
WBC OTHER # BLD: 9.5 K/UL (ref 4.5–11.5)

## 2024-08-13 PROCEDURE — 36415 COLL VENOUS BLD VENIPUNCTURE: CPT

## 2024-08-13 PROCEDURE — 93306 TTE W/DOPPLER COMPLETE: CPT | Performed by: INTERNAL MEDICINE

## 2024-08-13 PROCEDURE — 83036 HEMOGLOBIN GLYCOSYLATED A1C: CPT

## 2024-08-13 PROCEDURE — 93010 ELECTROCARDIOGRAM REPORT: CPT | Performed by: INTERNAL MEDICINE

## 2024-08-13 PROCEDURE — 80061 LIPID PANEL: CPT

## 2024-08-13 PROCEDURE — 6370000000 HC RX 637 (ALT 250 FOR IP): Performed by: FAMILY MEDICINE

## 2024-08-13 PROCEDURE — 85027 COMPLETE CBC AUTOMATED: CPT

## 2024-08-13 PROCEDURE — 2580000003 HC RX 258: Performed by: FAMILY MEDICINE

## 2024-08-13 PROCEDURE — 99232 SBSQ HOSP IP/OBS MODERATE 35: CPT | Performed by: STUDENT IN AN ORGANIZED HEALTH CARE EDUCATION/TRAINING PROGRAM

## 2024-08-13 PROCEDURE — G0378 HOSPITAL OBSERVATION PER HR: HCPCS

## 2024-08-13 PROCEDURE — 6370000000 HC RX 637 (ALT 250 FOR IP): Performed by: STUDENT IN AN ORGANIZED HEALTH CARE EDUCATION/TRAINING PROGRAM

## 2024-08-13 PROCEDURE — 93306 TTE W/DOPPLER COMPLETE: CPT

## 2024-08-13 RX ORDER — ATENOLOL 25 MG/1
50 TABLET ORAL DAILY
Status: DISCONTINUED | OUTPATIENT
Start: 2024-08-13 | End: 2024-08-13

## 2024-08-13 RX ORDER — GABAPENTIN 300 MG/1
300 CAPSULE ORAL 3 TIMES DAILY
Status: DISCONTINUED | OUTPATIENT
Start: 2024-08-13 | End: 2024-08-13

## 2024-08-13 RX ORDER — METHOCARBAMOL 500 MG/1
750 TABLET, FILM COATED ORAL 4 TIMES DAILY
Status: DISCONTINUED | OUTPATIENT
Start: 2024-08-13 | End: 2024-08-13

## 2024-08-13 RX ORDER — LOSARTAN POTASSIUM AND HYDROCHLOROTHIAZIDE 25; 100 MG/1; MG/1
1 TABLET ORAL DAILY
Status: DISCONTINUED | OUTPATIENT
Start: 2024-08-13 | End: 2024-08-13

## 2024-08-13 RX ORDER — ALLOPURINOL 300 MG/1
300 TABLET ORAL DAILY
Status: DISCONTINUED | OUTPATIENT
Start: 2024-08-13 | End: 2024-08-14 | Stop reason: HOSPADM

## 2024-08-13 RX ORDER — TRAMADOL HYDROCHLORIDE 50 MG/1
50 TABLET ORAL EVERY 6 HOURS PRN
Status: DISCONTINUED | OUTPATIENT
Start: 2024-08-13 | End: 2024-08-13

## 2024-08-13 RX ORDER — MECOBALAMIN 5000 MCG
5 TABLET,DISINTEGRATING ORAL NIGHTLY
Status: DISCONTINUED | OUTPATIENT
Start: 2024-08-13 | End: 2024-08-14 | Stop reason: HOSPADM

## 2024-08-13 RX ORDER — LOSARTAN POTASSIUM 50 MG/1
50 TABLET ORAL DAILY
Status: DISCONTINUED | OUTPATIENT
Start: 2024-08-13 | End: 2024-08-14 | Stop reason: HOSPADM

## 2024-08-13 RX ORDER — LOSARTAN POTASSIUM 50 MG/1
50 TABLET ORAL DAILY
COMMUNITY

## 2024-08-13 RX ORDER — OXYCODONE HYDROCHLORIDE AND ACETAMINOPHEN 5; 325 MG/1; MG/1
1 TABLET ORAL EVERY 4 HOURS PRN
Status: DISCONTINUED | OUTPATIENT
Start: 2024-08-13 | End: 2024-08-14 | Stop reason: HOSPADM

## 2024-08-13 RX ORDER — LORAZEPAM 1 MG/1
1 TABLET ORAL ONCE
Status: COMPLETED | OUTPATIENT
Start: 2024-08-13 | End: 2024-08-14

## 2024-08-13 RX ORDER — CITALOPRAM 20 MG/1
20 TABLET ORAL DAILY
Status: DISCONTINUED | OUTPATIENT
Start: 2024-08-13 | End: 2024-08-14 | Stop reason: HOSPADM

## 2024-08-13 RX ADMIN — Medication 5 MG: at 20:49

## 2024-08-13 RX ADMIN — SODIUM CHLORIDE, PRESERVATIVE FREE 10 ML: 5 INJECTION INTRAVENOUS at 20:49

## 2024-08-13 RX ADMIN — LOSARTAN POTASSIUM 50 MG: 50 TABLET, FILM COATED ORAL at 09:59

## 2024-08-13 RX ADMIN — ASPIRIN 81 MG 81 MG: 81 TABLET ORAL at 09:59

## 2024-08-13 RX ADMIN — ATORVASTATIN CALCIUM 80 MG: 40 TABLET, FILM COATED ORAL at 20:49

## 2024-08-13 RX ADMIN — CITALOPRAM HYDROBROMIDE 20 MG: 20 TABLET ORAL at 09:59

## 2024-08-13 RX ADMIN — SODIUM CHLORIDE, PRESERVATIVE FREE 10 ML: 5 INJECTION INTRAVENOUS at 10:19

## 2024-08-13 RX ADMIN — ALLOPURINOL 300 MG: 300 TABLET ORAL at 09:59

## 2024-08-13 NOTE — H&P
Hospital Medicine History & Physical      PCP: Miranda Ramos MD    Date of Admission: 8/12/2024    Date of Service: Pt seen/examined on 8/12/2024 and Placed in Observation.    Chief Complaint: Dizziness      History Of Present Illness:    74-year-old male past medical history of A-fib on Eliquis, hypertension, hyperlipidemia, COPD, depression presented from his PCP office due to concern for dizziness worse when he looks up given his symptoms he was sent to the ER. EKG with normal sinus rhythm.  Troponin of 23.  CT head obtained in the ER with no acute intracranial abnormality showed small lacunar infarcts in the periventricular left mid.  Total send from semiovale and also old lacunar infarcts in the left caudate head.  CTA of the neck showed occlusion of the left vertebral artery at its origin with minimal reconstitution at C3-C4 level becoming occluded at C2-C3 with some reconstitution at C1 level with V4 segment being occluded.  No stenosis or occlusion of the proximal intracranial arteries on CT head.Patient is a non-smoker and never smoked.  Denies any previous known history of stroke.  Initially has issues with his gait because he gets dizzy and also nausea off and on.  Symptoms are worse when he looks up.    Past Medical History:          Diagnosis Date    Arthritis     Atrial fibrillation (HCC)     COPD (chronic obstructive pulmonary disease) (HCC)     Depression     Gout     Hyperlipidemia     Hypertension        Past Surgical History:          Procedure Laterality Date    BACK SURGERY      lumbar laminectomy in [place] 5/2024       Medications Prior to Admission:      Prior to Admission medications    Medication Sig Start Date End Date Taking? Authorizing Provider   traMADol (ULTRAM) 50 MG tablet Take 1 tablet by mouth every 6 hours as needed for Pain. Max Daily Amount: 200 mg    Provider, MD Sarai   methocarbamol (ROBAXIN) 750 MG tablet Take 1 tablet by mouth 4 times daily    Provider,

## 2024-08-13 NOTE — ACP (ADVANCE CARE PLANNING)
Advance Care Planning   The patient has the following advanced directives on file:  Advance Directives       Power of  Living Will ACP-Advance Directive ACP-Power of     Not on File Not on File Not on File Not on File            The patient has appointed the following active healthcare agents:  spouse- Gail [phone]    The Patient has the following current code status:    Code Status: Full Code    Jorge L Delgado RN  8/13/2024

## 2024-08-13 NOTE — CONSULTS
Chief Complaint: Patient seen for evaluation of left vertebral artery stenosis      HPI: This patient has multiple comorbid risk factors including coronary artery disease with history of atrial fibrillation, long-term anticoagulation with Eliquis was admitted to the hospital, because of dizziness that slowly is getting worse for last few days, underwent CT of the carotids revealed evidence of left vertebral artery occlusion and vascular services consulted for further evaluation    On further questioning, patient did admit that he was not taking Eliquis for almost 6 days recently, forgot about it, and has done same thing last year also but not that frequently    Patient denies any chest pain palpitation shortness of breath      Patient denies any focal lateralizing neurological symptoms like loss of speech, vision or loss of function of extremity but does have dizziness especially when he is trying to ambulate    Patient can walk only 50 yards, gets pain in the right leg, and denies any symptoms of rest pain    Patient has multiple comorbid risk factors including as mentioned, coronary artery disease history atrial fibrillation, hypertension hyperlipidemia, chronic obstructive lung disease, history of depression and history of gout    Allergies   Allergen Reactions    Protonix [Pantoprazole] Swelling       Current Facility-Administered Medications   Medication Dose Route Frequency Provider Last Rate Last Admin    allopurinol (ZYLOPRIM) tablet 300 mg  300 mg Oral Daily Mushtaq Garcia MD   300 mg at 08/13/24 0959    citalopram (CELEXA) tablet 20 mg  20 mg Oral Daily Mushtaq Garcia MD   20 mg at 08/13/24 0959    [Held by provider] apixaban (ELIQUIS) tablet 5 mg  5 mg Oral BID Mushtaq Garcia MD        losartan (COZAAR) tablet 50 mg  50 mg Oral Daily Mushtaq Garcia MD   50 mg at 08/13/24 0959    oxyCODONE-acetaminophen (PERCOCET) 5-325 MG per tablet 1 tablet  1 tablet Oral Q4H PRN Vivian Faye MD

## 2024-08-13 NOTE — PLAN OF CARE
Problem: Safety - Adult  Goal: Free from fall injury  Outcome: Not Progressing     Problem: Pain  Goal: Verbalizes/displays adequate comfort level or baseline comfort level  Outcome: Not Progressing     Problem: Skin/Tissue Integrity  Goal: Absence of new skin breakdown  Description: 1.  Monitor for areas of redness and/or skin breakdown  2.  Assess vascular access sites hourly  3.  Every 4-6 hours minimum:  Change oxygen saturation probe site  4.  Every 4-6 hours:  If on nasal continuous positive airway pressure, respiratory therapy assess nares and determine need for appliance change or resting period.  Outcome: Not Progressing

## 2024-08-13 NOTE — CONSULTS
This 74-year-old right-handed man was referred from his family practitioner's office for the evaluation of unsteadiness and visual abnormalities.    He was deemed a good historian.    His medications clued allopurinol, citalopram, apixaban, losartan, oxycodone, lorazepam, melatonin, prednisone and recently aspirin.    His medical history was remarkable for longstanding atrial fibrillation, without coronary artery disease, chronic obstructive lung disease, depression, gout, hyperlipidemia, hypertension, degenerative joint disease and obesity.  He was also diagnosed with obstructive sleep apnea, but could not tolerate CPAP administration.  He denied past strokes, seizures, diabetes mellitus, cancers, skin abnormalities or other psychological events.    He underwent 2 lumbosacral laminectomies.    He was allergic to pantoprazole, which caused lip swelling.  He denied severe trauma.    He was a native of the [place].  He lived at home with his wife.  He previously worked in the still in district, retiring 5 years ago.  He stopped smoking and drinking 6 years ago.  He formerly consumed 1 to 2 packs of cigarettes daily and drank 1-2 beers daily.    He slept poorly but ate too well.    Review of systems was otherwise unremarkable, except as noted below.    Yesterday it was at a regular visit in his physician's office, when he noted difficulties looking upward as well as increasing unsteadiness when walking.  He noted intermittent double vision as well, but denied loss of vision, headaches, swallowing or chewing difficulties, hearing loss, weakness in any limb, sensory loss or increasing clumsiness.  He reported difficulties walking due to his lumbosacral laminectomies.    He noted visual abnormalities in the past, but could not further define these issues.  There were no other neurological or medical problems.    Physical examination displayed stable vital signs, with a blood pressure 114/70 and respiratory rate

## 2024-08-14 ENCOUNTER — APPOINTMENT (OUTPATIENT)
Dept: MRI IMAGING | Age: 74
End: 2024-08-14
Payer: MEDICARE

## 2024-08-14 ENCOUNTER — TELEPHONE (OUTPATIENT)
Dept: VASCULAR SURGERY | Age: 74
End: 2024-08-14

## 2024-08-14 ENCOUNTER — APPOINTMENT (OUTPATIENT)
Dept: INTERVENTIONAL RADIOLOGY/VASCULAR | Age: 74
End: 2024-08-14
Payer: MEDICARE

## 2024-08-14 VITALS
RESPIRATION RATE: 17 BRPM | DIASTOLIC BLOOD PRESSURE: 73 MMHG | HEART RATE: 77 BPM | BODY MASS INDEX: 38.22 KG/M2 | SYSTOLIC BLOOD PRESSURE: 123 MMHG | OXYGEN SATURATION: 97 % | WEIGHT: 267 LBS | HEIGHT: 70 IN | TEMPERATURE: 98.1 F

## 2024-08-14 PROBLEM — H51.0 VERTICAL DISSOCIATED GAZE PALSY: Status: ACTIVE | Noted: 2024-08-14

## 2024-08-14 PROBLEM — I65.02 VERTEBRAL ARTERY OCCLUSION, LEFT: Status: ACTIVE | Noted: 2024-08-14

## 2024-08-14 PROBLEM — I73.9 PVD (PERIPHERAL VASCULAR DISEASE) WITH CLAUDICATION (HCC): Status: ACTIVE | Noted: 2024-08-14

## 2024-08-14 PROBLEM — R26.89 IMBALANCE: Status: ACTIVE | Noted: 2024-08-14

## 2024-08-14 LAB
ANION GAP SERPL CALCULATED.3IONS-SCNC: 9 MMOL/L (ref 7–16)
BUN SERPL-MCNC: 17 MG/DL (ref 6–23)
CALCIUM SERPL-MCNC: 9 MG/DL (ref 8.6–10.2)
CHLORIDE SERPL-SCNC: 105 MMOL/L (ref 98–107)
CO2 SERPL-SCNC: 26 MMOL/L (ref 22–29)
CREAT SERPL-MCNC: 1 MG/DL (ref 0.7–1.2)
ECHO BSA: 2.45 M2
ERYTHROCYTE [DISTWIDTH] IN BLOOD BY AUTOMATED COUNT: 14.6 % (ref 11.5–15)
FOLATE SERPL-MCNC: 9.5 NG/ML (ref 4.8–24.2)
GFR, ESTIMATED: 81 ML/MIN/1.73M2
GLUCOSE SERPL-MCNC: 104 MG/DL (ref 74–99)
HCT VFR BLD AUTO: 39.6 % (ref 37–54)
HGB BLD-MCNC: 12.5 G/DL (ref 12.5–16.5)
MCH RBC QN AUTO: 29.5 PG (ref 26–35)
MCHC RBC AUTO-ENTMCNC: 31.6 G/DL (ref 32–34.5)
MCV RBC AUTO: 93.4 FL (ref 80–99.9)
PLATELET # BLD AUTO: 252 K/UL (ref 130–450)
PMV BLD AUTO: 11.1 FL (ref 7–12)
POTASSIUM SERPL-SCNC: 4 MMOL/L (ref 3.5–5)
RBC # BLD AUTO: 4.24 M/UL (ref 3.8–5.8)
SODIUM SERPL-SCNC: 140 MMOL/L (ref 132–146)
VIT B12 SERPL-MCNC: 243 PG/ML (ref 211–946)
WBC OTHER # BLD: 9.6 K/UL (ref 4.5–11.5)

## 2024-08-14 PROCEDURE — 86041 ACETYLCHOLN RCPTR BNDNG ANTB: CPT

## 2024-08-14 PROCEDURE — G0378 HOSPITAL OBSERVATION PER HR: HCPCS

## 2024-08-14 PROCEDURE — 6370000000 HC RX 637 (ALT 250 FOR IP): Performed by: STUDENT IN AN ORGANIZED HEALTH CARE EDUCATION/TRAINING PROGRAM

## 2024-08-14 PROCEDURE — 70551 MRI BRAIN STEM W/O DYE: CPT

## 2024-08-14 PROCEDURE — 82746 ASSAY OF FOLIC ACID SERUM: CPT

## 2024-08-14 PROCEDURE — 82607 VITAMIN B-12: CPT

## 2024-08-14 PROCEDURE — 97166 OT EVAL MOD COMPLEX 45 MIN: CPT

## 2024-08-14 PROCEDURE — 99239 HOSP IP/OBS DSCHRG MGMT >30: CPT | Performed by: STUDENT IN AN ORGANIZED HEALTH CARE EDUCATION/TRAINING PROGRAM

## 2024-08-14 PROCEDURE — 6370000000 HC RX 637 (ALT 250 FOR IP): Performed by: FAMILY MEDICINE

## 2024-08-14 PROCEDURE — 97161 PT EVAL LOW COMPLEX 20 MIN: CPT

## 2024-08-14 PROCEDURE — 97530 THERAPEUTIC ACTIVITIES: CPT

## 2024-08-14 PROCEDURE — 93923 UPR/LXTR ART STDY 3+ LVLS: CPT | Performed by: SURGERY

## 2024-08-14 PROCEDURE — 36415 COLL VENOUS BLD VENIPUNCTURE: CPT

## 2024-08-14 PROCEDURE — 93923 UPR/LXTR ART STDY 3+ LVLS: CPT

## 2024-08-14 PROCEDURE — 80048 BASIC METABOLIC PNL TOTAL CA: CPT

## 2024-08-14 PROCEDURE — 97535 SELF CARE MNGMENT TRAINING: CPT

## 2024-08-14 PROCEDURE — 2580000003 HC RX 258: Performed by: FAMILY MEDICINE

## 2024-08-14 PROCEDURE — 85027 COMPLETE CBC AUTOMATED: CPT

## 2024-08-14 PROCEDURE — 99233 SBSQ HOSP IP/OBS HIGH 50: CPT | Performed by: NURSE PRACTITIONER

## 2024-08-14 PROCEDURE — 86042 ACETYLCHOLN RCPTR BLCKG ANTB: CPT

## 2024-08-14 RX ORDER — ASPIRIN 81 MG/1
81 TABLET, CHEWABLE ORAL DAILY
Qty: 30 TABLET | Refills: 3 | Status: SHIPPED | OUTPATIENT
Start: 2024-08-15

## 2024-08-14 RX ADMIN — CITALOPRAM HYDROBROMIDE 20 MG: 20 TABLET ORAL at 08:28

## 2024-08-14 RX ADMIN — ASPIRIN 81 MG 81 MG: 81 TABLET ORAL at 08:28

## 2024-08-14 RX ADMIN — SODIUM CHLORIDE, PRESERVATIVE FREE 10 ML: 5 INJECTION INTRAVENOUS at 08:31

## 2024-08-14 RX ADMIN — LOSARTAN POTASSIUM 50 MG: 50 TABLET, FILM COATED ORAL at 08:28

## 2024-08-14 RX ADMIN — LORAZEPAM 1 MG: 1 TABLET ORAL at 08:50

## 2024-08-14 RX ADMIN — ALLOPURINOL 300 MG: 300 TABLET ORAL at 08:28

## 2024-08-14 NOTE — PROGRESS NOTES
4 Eyes Skin Assessment     NAME:  Isaias Sierra  YOB: 1950  MEDICAL RECORD NUMBER:  [id number]    The patient is being assessed for  Admission    I agree that at least one RN has performed a thorough Head to Toe Skin Assessment on the patient. ALL assessment sites listed below have been assessed.      Areas assessed by both nurses:    Head, Face, Ears, Shoulders, Back, Chest, Arms, Elbows, Hands, Sacrum. Buttock, Coccyx, Ischium, and Legs. Feet and Heels        Does the Patient have a Wound? No noted wound(s)       Herman Prevention initiated by RN: Yes  Wound Care Orders initiated by RN: Yes    Pressure Injury (Stage 3,4, Unstageable, DTI, NWPT, and Complex wounds) if present, place Wound referral order by RN under : No    New Ostomies, if present place, Ostomy referral order under : No     Nurse 1 eSignature: Electronically signed by Mohini Christine RN on 8/13/24 at 11:34 AM EDT    **SHARE this note so that the co-signing nurse can place an eSignature**    Nurse 2 eSignature: {Esignature:[id number]}    
Bathroom assistance provided.  Pt unsteady gait.   
Call. Placed to patient's wife to relay that patient will be discharging today and is available fr [facility]. Briefly reviewed discharge instructions and answered all questions.   
Comprehensive Nutrition Assessment    Type and Reason for Visit:  Initial, Positive Nutrition Screen    Nutrition Recommendations/Plan:   Continue current diet  Start Ensure BID     Malnutrition Assessment:  Malnutrition Status:  Insufficient data (08/14/24 1513)    Context:  Acute Illness     Findings of the 6 clinical characteristics of malnutrition:  Energy Intake:  Mild decrease in energy intake (Comment) (since admit)  Weight Loss:  Unable to assess (limited wt hx on file / possible 11% x 1 year)     Body Fat Loss:  Unable to assess     Muscle Mass Loss:  Unable to assess    Fluid Accumulation:  No significant fluid accumulation     Strength:  Not Performed    Nutrition Assessment:    Pt admit w/ dizziness & concern for stroke. Pt found w/ L vertebral artery occlusion. PMHx COPD, CAD, HLD, prior CVA, depression, HTN. Noted recent s/p lami 5/2024 at [facility]. Will add ONS and continue to monitor.    Nutrition Related Findings:    pt alert, active BS, trace edema, -I/os Wound Type: None       Current Nutrition Intake & Therapies:    Average Meal Intake: Unable to assess (no intakes per doc flow)  Average Supplements Intake: None Ordered  ADULT DIET; Regular    Anthropometric Measures:  Height: 177.8 cm (5' 10\")  Ideal Body Weight (IBW): 166 lbs (75 kg)       Current Body Weight: 121.1 kg (267 lb) (8/13 no method), 160.8 % IBW.    Current BMI (kg/m2): 38.3  Usual Body Weight: 136.1 kg (300 lb) (stated x 1 year per EMR. No actual wt hx on file)  % Weight Change (Calculated): -11                    BMI Categories: Obese Class 2 (BMI 35.0 -39.9)    Estimated Daily Nutrient Needs:  Energy Requirements Based On: Formula  Weight Used for Energy Requirements: Current  Energy (kcal/day):   Weight Used for Protein Requirements: Ideal  Protein (g/day):  (1.3-1.5 gm/kg IBW)  Method Used for Fluid Requirements: 1 ml/kcal  Fluid (ml/day):     Nutrition Diagnosis:   Inadequate oral intake related to cognitive 
Discharge instrictions reviewed with patient at the bedside. Stroke education completed with patient and informational packed provided. All questions answered at at this time.    
MRI screening form needs completed, thank you.  
[facility]  Neuro Inpatient Follow Up        Isaias Sierra is a 74 y.o. right handed male     Neuro is following for unsteadiness and vision abnormalities    Significant PMH: AF, CAD, COPD, depression, HTN, HLD, degenerative joint disease, obesity, KRISTEN not on CPAP, lumbar degenerative disease s/p lami in May at [facility], cervical spondylosis    HPI:  The patient was admitted on 8/12 from his physician's office with dizziness when looking upward as well as increasing unsteadiness when walking. His wife also states he was \"out of it\" for a short period at the office and PCP was concerned for stroke.  He noted intermittent double vision as well    NIH 0 on arrival. CTAs showed L VA occlusion and old lacunar strokes b/l. No intervention warranted per neuro IR. ASA added. Vascular also following for L carotid stenosis of 40-50%    Wife stated gait issues have been ongoing for past few years and he has been following with [facility] neurology and neurosurgery for his cervical and lumbar spondylosis--had lumbar lami in May 2024. Despite surgery, he has had persistent gait issues and balance problems and cannot come off the walker--he has been in PT and saw ENT, cardio, and vestibular rehab.      He is on Eliquis at home for hx of AF--no missed doses    On exam there is impairment of vertical upgaze, pathological staring, and concern for R LEANNE and R sided ataxia    MRI of the brain was negative    Subjective:  He is somewhat drowsy having just come back from MRI--he received ativan as pre-med. Still unable to move eyes upward and he sees double when objects are close up. No vision loss or eye pain. No weakness in arms or legs. No clumsiness.    PT/OT evaluations are pending. There is a video monitor at bedside because he has been getting up without help.     No family present, but I did speak with his wife via phone    No chest pain or palpitations  No SOB  No lightheadedness or loss of 
Neurology consult sent via ps to Cali add to treatment team.  
OCCUPATIONAL THERAPY INITIAL EVALUATION    [facility] [address] [place], [place]       Date:2024                                                  Patient Name: Isaias Sierra  MRN: [id number]  : 1950  Room: [address] [place] [address]    Evaluating OT: Natanael White OTR/L [id number]    Referring Provider: Mushtaq Garcia MD      Specific Provider Orders/Date: OT evaluation and treatment 24 1012    Diagnosis:  Dizziness [R42]  Vertebral artery occlusion, left [I65.02]  Cerebrovascular accident (CVA), unspecified mechanism (HCC) [I63.9]      Pertinent Medical History:  has a past medical history of Arthritis, Atrial fibrillation ([facility]), COPD (chronic obstructive pulmonary disease) ([facility]), Decreased dorsalis pedis pulse, Depression, Gout, Hyperlipidemia, Hypertension, Left carotid stenosis, Occlusion of left vertebral artery, and PVD (peripheral vascular disease) with claudication ([facility]).   Past Surgical History:   Procedure Laterality Date    BACK SURGERY      lumbar laminectomy in [place] 2024       Pt admitted to the hospital  with dizziness from Dr office with impairments in vision as well    MRI brain:   IMPRESSION:  1. No acute infarct or acute intracranial process identified.  2. Mild chronic small vessel ischemic changes.    L3 laminectomy, L4 superior laminotomy, L3,4 bilateral partial medial facetectomies; Through a separate second incision removal of thoracic spinal cord stimulator epidural array, Through a third incision in the left buttock, removal of spinal cord stimulator generator. Surgery by Dr. Prabhakar 5/15.     Orders received, chart reviewed, eval complete.     Precautions:  Fall Risk, TSM     Assessment of current deficits   [x] Functional mobility   [x]ADLs  [x] Strength               []Cognition   [x] Functional transfers   [x] IADLs         [x] Safety Awareness   [x]Endurance   [] Fine Motor Coordination [x] 
Physical Therapy Initial Evaluation    Name: Isaias Sierra  : 1950  MRN: [id number]      Date of Service: 2024    Evaluating PT:  Hugo Suresh, PT [id number]    Referring provider/PT Order:  PT Eval and Treat   24  PT evaluation and treat  Start:  24,   End:  24,   ONE TIME,   Standing Count:  1 Occurrences,   R         Mushtaq Garcia MD     Room #:  8203/8203-A  Diagnosis:  Dizziness [R42]  Vertebral artery occlusion, left [I65.02]  Cerebrovascular accident (CVA), unspecified mechanism (HCC) [I63.9]  PMHx/PSHx:     has a past medical history of Arthritis, Atrial fibrillation ([facility]), Cervical spondylosis, COPD (chronic obstructive pulmonary disease) ([facility]), Decreased dorsalis pedis pulse, Depression, Gout, History of lumbar laminectomy, Hyperlipidemia, Hypertension, Left carotid stenosis, Occlusion of left vertebral artery, and PVD (peripheral vascular disease) with claudication ([facility]).    has a past surgical history that includes back surgery.     Procedure/Surgery:  none  Precautions:  Falls, FWB Bilateral LE,   Equipment Needs: Patient has needed equipment ,    SUBJECTIVE:     Pt lives with spouse in a 1 story house with 1 step to enter  and no hand rails   Bed is on 1 floor and bath is on 1 floor.  Patient ambulated independently, with wheeled walker  PTA. Equipment owned: Cane, Wheeled Walker, and Hospital bed,      OBJECTIVE:   Initial Evaluation  Date: 24 Treatment Short Term/ Long Term   Goals   AM-PAC 6 Clicks      Was pt agreeable to Eval/treatment? Yes      Does pt have pain? yes     Bed Mobility  Rolling: SBA  Supine to sit:   SBA   Sit to supine: SBA   Scooting: SBA   Completed slowly  Rolling: Ind  Supine to sit: Ind  Sit to supine: Ind  Scooting: Ind   Transfers Sit to stand: SBA to Wheeled Walker  Stand to sit: SBA  Stand pivot: CGA with Wheeled Walker  Sit to stand: Mod Ind  to Wheeled Walker  Stand to sit: Mod Ind    Stand pivot: Mod Ind  
Pt refusing bed alarm. Safety teaching completed, pt verbalized understanding. Pt is unsteady and a high fall risk. Virtual  ordered at this time for safety precautions.   
internal carotid stenosis, natural history slow gradual worsening over many months and years, not to worry, follow-up evaluation see me in 1 year but come to the hospital for any strokelike symptoms    I also called patient's wife, Gail, telephone #[phone], explained to her all the above recommended them follow-up evaluation see me in 1 year and all the questions were answered  Vascular service will sign of the case and see the patient as needed for      Miguel Ling MD            
stenosis of the right vertebral, basilar, or posterior cerebral arteries. No aneurysm.  There is a large caliber left posterior communicating artery with absent P1 segment of the left PCA, a common variant of normal.  I cannot identify the right posterior communicating artery, a variant of normal. OTHER: Venous enhancement in the brain is not sufficient to evaluate for venous sinus thrombosis.     1. CT of the head shows no acute intracranial abnormality. 2. Small lacunar infarct in the periventricular left mid parietal centrum semiovale. Old lacunar infarct in the left caudate head. 3. Mild cerebellar atrophy. 4. CTA of the neck shows occlusion of the left vertebral artery at its origin. There is minimal reconstitution of the tiny caliber left vertebral artery at the C3-4 level, becoming occluded at the C2-3 level. There is minimal reconstitution of the tiny caliber left vertebral artery at the level of the C1 ring. The superior V4 segment of the left vertebral artery becomes occluded as it passes into the calvarium. 5. 50% stenosis of the proximal left internal carotid artery from noncalcified plaque, using NASCET criteria. 6.  CTA of the head shows no stenosis or occlusion of the proximal intracranial arteries.     CTA NECK W CONTRAST    Result Date: 8/12/2024  EXAMINATION: CTA OF THE HEAD WITH CONTRAST; CTA OF THE NECK 8/12/2024 7:46 pm: TECHNIQUE: CTA of the head/brain was performed with the administration of intravenous contrast. Multiplanar reformatted images are provided for review.  MIP images are provided for review. CT of the head was performed without the administration of intravenous contrast. CTA of the neck was performed with the administration of intravenous contrast. Multiplanar reformatted images are provided for review.  MIP images are provided for review. Stenosis of the internal carotid arteries measured using NASCET criteria. Automated exposure control, iterative reconstruction, and/or weight 
PROVIDED HISTORY: Reason for exam:->dizziness Has a \"code stroke\" or \"stroke alert\" been called?-> What reading provider will be dictating this exam?->CRC FINDINGS: CT HEAD: BRAIN/VENTRICLES:  No acute intracranial hemorrhage or extraaxial fluid collection. Grey-white differentiation is maintained.  No evidence of mass, mass effect or midline shift.  No evidence of hydrocephalus. There is mild age-appropriate dilatation of the ventricles and sulci. There is mild cerebellar atrophy. A small lacunar infarct is seen in the periventricular left mid parietal centrum semiovale.  Old lacunar infarct is seen in the left caudate head. ORBITS: There are changes of bilateral cataract surgery.  The orbits are otherwise normal in appearance. SINUSES:  The visualized paranasal sinuses and mastoid air cells demonstrate no acute abnormality. SOFT TISSUES/SKULL: No acute abnormality of the visualized skull or soft tissues. CTA NECK: AORTIC ARCH/ARCH VESSELS: No dissection or arterial injury.  No significant stenosis of the brachiocephalic or subclavian arteries. CAROTID ARTERIES: Right carotid: Moderate calcified plaque of the proximal right internal carotid artery without stenosis using NASCET criteria.  Mild calcified plaque at the origin of the right internal carotid artery without stenosis using NASCET criteria. Left carotid: 50% stenosis of the proximal left internal carotid artery from noncalcified plaque, using NASCET criteria.  Mild calcific plaque at the origin of the left internal carotid artery without stenosis using NASCET criteria. VERTEBRAL ARTERIES: The left vertebral artery is occluded at its origin. There is minimal reconstitution of the tiny caliber left vertebral artery at the C3-4 level, becoming occluded at the C2-3 level.  There is minimal reconstitution of the tiny left vertebral artery at the level of the C1 ring. The superior V4 segment of the left vertebral artery becomes occluded as it passes into the

## 2024-08-14 NOTE — CARE COORDINATION
08/14/24 CM update:  Pt admitted OBS from ED for dizziness MRI brain PT/OT rodolfo pending Met with pt wife for confirmation of discharge planning She has some concerns about pt returning home but wants to see about PT/OT evals she thinks pt may benefit from stay at [place] discussed criteria for admission with wife Called [phone] to have PT sign up to see pt CM/SW to follow Electronically signed by Jorge L Rueda RN CM on 8/14/2024 at 12:55 PM     3:26pm PT 17/24 pt walked 50 ft with WW MRI shows no infarct pt will not qualify for ARU or SNF Pt is active with [facility] JAIMEE orders on chart [facility] will need notified of discharge Electronically signed by Jorge L Rueda RN on 8/14/2024 at 3:28 PM   
Alert    Hospitalization in the last 30 days (Readmission):  No    If yes, Readmission Assessment in  Navigator will be completed.    Advance Directives:      Code Status: Full Code   Patient's Primary Decision Maker is: Legal Next of Kin      Discharge Planning:    Patient lives with: Parent Type of Home: House  Primary Care Giver: Self  Patient Support Systems include: Spouse/Significant Other   Current Financial resources:    Current community resources:    Current services prior to admission:              Current DME:              Type of Home Care services:       ADLS  Prior functional level: Independent in ADLs/IADLs  Current functional level: Independent in ADLs/IADLs    PT AM-PAC:   /24  OT AM-PAC:   /24    Family can provide assistance at DC: Yes  Would you like Case Management to discuss the discharge plan with any other family members/significant others, and if so, who? Yes  Plans to Return to Present Housing: Yes  Potential Assistance needed at discharge:              Potential DME:    Patient expects to discharge to:    Plan for transportation at discharge:      Financial    Payor: MEDICARE / Plan: MEDICARE PART A AND B / Product Type: *No Product type* /     Does insurance require precert for SNF: Yes    Potential assistance Purchasing Medications:    Meds-to-Beds request: Yes      [id number] - [place], [place] - [address] - P [phone] - F [phone]  [address]  [facility] [address]  Phone: [phone] Fax: [phone]      Notes:    Factors facilitating achievement of predicted outcomes: Family support    Barriers to discharge: Limited insight into deficits, Lower extremity weakness, and Long standing deficits    The Plan for Transition of Care is related to the following treatment goals of Dizziness [R42]  Vertebral artery occlusion, left [I65.02]  Cerebrovascular accident (CVA), unspecified mechanism (HCC) [I63.9]    IF APPLICABLE: The Patient and/or patient

## 2024-08-14 NOTE — DISCHARGE SUMMARY
known as: ELIQUIS     atorvastatin 40 MG tablet  Commonly known as: LIPITOR     citalopram 20 MG tablet  Commonly known as: CELEXA     losartan 50 MG tablet  Commonly known as: COZAAR            STOP taking these medications      predniSONE 20 MG tablet  Commonly known as: DELTASONE               Where to Get Your Medications        These medications were sent to [id number] - [facility] [address] - P [phone] - F [phone] [address], [facility] [address]      Phone: [phone]   aspirin 81 MG chewable tablet           Note that more than 30 minutes was spent in preparing discharge papers, discussing discharge with patient, medication review, etc.    Signed:  Electronically signed by Vivian Faye MD on 8/14/2024 at 4:56 PM

## 2024-08-18 LAB
ACHR BIND AB SER-SCNC: 0 NMOL/L (ref 0–0.4)
ACHR BLOCK AB/ACHR TOTAL SFR SER: 0 % (ref 0–26)

## 2025-06-04 ENCOUNTER — HOSPITAL ENCOUNTER (EMERGENCY)
Age: 75
Discharge: HOME | End: 2025-06-04
Payer: MEDICARE

## 2025-06-04 VITALS
BODY MASS INDEX: 39.31 KG/M2 | OXYGEN SATURATION: 98 % | SYSTOLIC BLOOD PRESSURE: 143 MMHG | TEMPERATURE: 98.7 F | DIASTOLIC BLOOD PRESSURE: 67 MMHG | HEART RATE: 78 BPM | RESPIRATION RATE: 18 BRPM | WEIGHT: 274 LBS

## 2025-06-04 DIAGNOSIS — J06.9 ACUTE UPPER RESPIRATORY INFECTION: Primary | ICD-10-CM

## 2025-06-04 PROCEDURE — 99211 OFF/OP EST MAY X REQ PHY/QHP: CPT

## 2025-06-04 RX ORDER — BENZONATATE 200 MG/1
200 CAPSULE ORAL 3 TIMES DAILY PRN
Qty: 30 CAPSULE | Refills: 0 | Status: SHIPPED | OUTPATIENT
Start: 2025-06-04 | End: 2025-06-14

## 2025-06-04 RX ORDER — PREDNISONE 20 MG/1
20 TABLET ORAL DAILY
Qty: 5 TABLET | Refills: 0 | Status: SHIPPED | OUTPATIENT
Start: 2025-06-04 | End: 2025-06-09

## 2025-06-04 ASSESSMENT — PAIN - FUNCTIONAL ASSESSMENT: PAIN_FUNCTIONAL_ASSESSMENT: 0-10

## 2025-06-04 ASSESSMENT — PAIN SCALES - GENERAL: PAINLEVEL_OUTOF10: 0

## 2025-06-04 NOTE — ED PROVIDER NOTES
Independent CODY Visit.    [facility] URGENT CARE  ED  Encounter Note  Admit Date/RoomTime: 2025  3:39 PM  ED Room:   NAME: Isaias Sierra  : 1950  MRN: [id number]  PCP: Miranda Ramos MD    CHIEF COMPLAINT     Cough and Shortness of Breath    HISTORY OF PRESENT ILLNESS        Isaias Sierra is a 75 y.o. male who presents to the Urgent Care  for evaluation of cough and shortness of breath. Pt is accompanied by wife who reports that symptoms have been on going for a week with some improvement however wife reports that he seem to be having an increase in symptoms and getting worse. Pt reports that he is chilled at night and denies fever, or N/V/D. Pt is currently using Albuterol and Triligy with minimal improvement. Pt currently not smoking and does have sick contacts. Pt has a history of PSP-progressive supra nuclear palsy-that does cause some difficulty speaking.     REVIEW OF SYSTEMS     Pertinent positives and negatives are stated within HPI, all other systems reviewed and are negative.    Past Medical History:  has a past medical history of Arthritis, Atrial fibrillation (HCC), Cervical spondylosis, COPD (chronic obstructive pulmonary disease) (HCC), Decreased dorsalis pedis pulse, Depression, Gout, History of lumbar laminectomy, Hyperlipidemia, Hypertension, Left carotid stenosis, Occlusion of left vertebral artery, and PVD (peripheral vascular disease) with claudication.  Surgical History:  has a past surgical history that includes back surgery.  Social History:  reports that he quit smoking about 5 years ago. His smoking use included cigarettes. He started smoking about 59 years ago. He has a 54 pack-year smoking history. His smokeless tobacco use includes chew. He reports current alcohol use of about 6.0 standard drinks of alcohol per week. He reports that he does not use drugs.  Family History: family history is not on file.   Allergies: Protonix [pantoprazole]  CURRENT

## 2025-06-04 NOTE — DISCHARGE INSTRUCTIONS
Take all medication as ordered  Complete all antibiotics-even when feeling better  Increase fluids  Honey for cough, cough drops, hard candy

## 2025-08-06 ENCOUNTER — OFFICE VISIT (OUTPATIENT)
Age: 75
End: 2025-08-06
Payer: MEDICARE

## 2025-08-06 VITALS
DIASTOLIC BLOOD PRESSURE: 70 MMHG | TEMPERATURE: 97.4 F | HEART RATE: 63 BPM | HEIGHT: 70 IN | BODY MASS INDEX: 39.22 KG/M2 | SYSTOLIC BLOOD PRESSURE: 113 MMHG | OXYGEN SATURATION: 98 % | WEIGHT: 274 LBS

## 2025-08-06 DIAGNOSIS — G23.1 PROGRESSIVE SUPRANUCLEAR PALSY (HCC): Primary | ICD-10-CM

## 2025-08-06 DIAGNOSIS — H91.93 BILATERAL HEARING LOSS, UNSPECIFIED HEARING LOSS TYPE: ICD-10-CM

## 2025-08-06 PROCEDURE — G8427 DOCREV CUR MEDS BY ELIG CLIN: HCPCS | Performed by: PSYCHIATRY & NEUROLOGY

## 2025-08-06 PROCEDURE — 99204 OFFICE O/P NEW MOD 45 MIN: CPT | Performed by: PSYCHIATRY & NEUROLOGY

## 2025-08-06 PROCEDURE — 3017F COLORECTAL CA SCREEN DOC REV: CPT | Performed by: PSYCHIATRY & NEUROLOGY

## 2025-08-06 PROCEDURE — G8417 CALC BMI ABV UP PARAM F/U: HCPCS | Performed by: PSYCHIATRY & NEUROLOGY

## 2025-08-06 PROCEDURE — 1123F ACP DISCUSS/DSCN MKR DOCD: CPT | Performed by: PSYCHIATRY & NEUROLOGY

## 2025-08-06 PROCEDURE — 4004F PT TOBACCO SCREEN RCVD TLK: CPT | Performed by: PSYCHIATRY & NEUROLOGY

## 2025-08-06 PROCEDURE — 99205 OFFICE O/P NEW HI 60 MIN: CPT | Performed by: PSYCHIATRY & NEUROLOGY

## 2025-08-06 RX ORDER — MIRTAZAPINE 15 MG/1
TABLET, FILM COATED ORAL
COMMUNITY

## 2025-08-06 RX ORDER — PYRIDOXINE HCL (VITAMIN B6) 50 MG
1 TABLET ORAL DAILY
COMMUNITY
Start: 2024-10-18 | End: 2025-10-18

## 2025-08-06 RX ORDER — AMANTADINE HYDROCHLORIDE 100 MG/1
CAPSULE, GELATIN COATED ORAL
COMMUNITY
Start: 2025-05-21 | End: 2025-08-06 | Stop reason: SDUPTHER

## 2025-08-06 RX ORDER — FOLIC ACID 1 MG/1
1000 TABLET ORAL DAILY
COMMUNITY

## 2025-08-06 RX ORDER — CARBIDOPA AND LEVODOPA 25; 100 MG/1; MG/1
TABLET ORAL
COMMUNITY
Start: 2025-07-15

## 2025-08-06 RX ORDER — AMANTADINE HYDROCHLORIDE 100 MG/1
100 CAPSULE, GELATIN COATED ORAL DAILY
Qty: 90 CAPSULE | Refills: 1 | Status: SHIPPED | OUTPATIENT
Start: 2025-08-06 | End: 2025-11-04

## 2025-08-06 RX ORDER — PREDNISONE 10 MG/1
TABLET ORAL
COMMUNITY
Start: 2024-06-21

## 2025-08-14 ENCOUNTER — OFFICE VISIT (OUTPATIENT)
Dept: VASCULAR SURGERY | Age: 75
End: 2025-08-14
Payer: MEDICARE

## 2025-08-14 DIAGNOSIS — I65.22 LEFT CAROTID STENOSIS: ICD-10-CM

## 2025-08-14 DIAGNOSIS — I65.02 VERTEBRAL ARTERY OCCLUSION, LEFT: Primary | ICD-10-CM

## 2025-08-14 DIAGNOSIS — R09.89 DECREASED DORSALIS PEDIS PULSE: ICD-10-CM

## 2025-08-14 PROBLEM — R42 DIZZINESS: Status: RESOLVED | Noted: 2024-08-12 | Resolved: 2025-08-14

## 2025-08-14 PROBLEM — I73.9 PVD (PERIPHERAL VASCULAR DISEASE) WITH CLAUDICATION: Status: RESOLVED | Noted: 2024-08-14 | Resolved: 2025-08-14

## 2025-08-14 PROCEDURE — G8427 DOCREV CUR MEDS BY ELIG CLIN: HCPCS | Performed by: SURGERY

## 2025-08-14 PROCEDURE — 1160F RVW MEDS BY RX/DR IN RCRD: CPT | Performed by: SURGERY

## 2025-08-14 PROCEDURE — 4004F PT TOBACCO SCREEN RCVD TLK: CPT | Performed by: SURGERY

## 2025-08-14 PROCEDURE — 3017F COLORECTAL CA SCREEN DOC REV: CPT | Performed by: SURGERY

## 2025-08-14 PROCEDURE — 99214 OFFICE O/P EST MOD 30 MIN: CPT | Performed by: SURGERY

## 2025-08-14 PROCEDURE — 1123F ACP DISCUSS/DSCN MKR DOCD: CPT | Performed by: SURGERY

## 2025-08-14 PROCEDURE — 1159F MED LIST DOCD IN RCRD: CPT | Performed by: SURGERY

## 2025-08-14 PROCEDURE — G8417 CALC BMI ABV UP PARAM F/U: HCPCS | Performed by: SURGERY

## 2025-08-15 ENCOUNTER — TELEPHONE (OUTPATIENT)
Dept: VASCULAR SURGERY | Age: 75
End: 2025-08-15

## 2025-08-15 ENCOUNTER — HOSPITAL ENCOUNTER (OUTPATIENT)
Dept: CARDIOLOGY | Age: 75
Discharge: HOME | End: 2025-08-17
Payer: MEDICARE

## 2025-08-15 DIAGNOSIS — I65.22 LEFT CAROTID STENOSIS: ICD-10-CM

## 2025-08-15 LAB
VAS LEFT CCA DIST EDV: 18.5 CM/S
VAS LEFT CCA DIST PSV: 64.4 CM/S
VAS LEFT CCA PROX EDV: 21.9 CM/S
VAS LEFT CCA PROX PSV: 90 CM/S
VAS LEFT ECA EDV: 7.1 CM/S
VAS LEFT ECA PSV: 110.5 CM/S
VAS LEFT ICA DIST EDV: 23.4 CM/S
VAS LEFT ICA DIST PSV: 94.2 CM/S
VAS LEFT ICA MID EDV: 33.2 CM/S
VAS LEFT ICA MID PSV: 141.2 CM/S
VAS LEFT ICA PROX EDV: 64.1 CM/S
VAS LEFT ICA PROX PSV: 203.3 CM/S
VAS LEFT ICA/CCA PSV: 2.3 NO UNITS
VAS RIGHT CCA DIST EDV: 18 CM/S
VAS RIGHT CCA DIST PSV: 85.4 CM/S
VAS RIGHT CCA PROX EDV: 15.5 CM/S
VAS RIGHT CCA PROX PSV: 77.8 CM/S
VAS RIGHT ECA EDV: 7 CM/S
VAS RIGHT ECA PSV: 127.9 CM/S
VAS RIGHT ICA DIST EDV: 36.1 CM/S
VAS RIGHT ICA DIST PSV: 110.5 CM/S
VAS RIGHT ICA MID EDV: 26.9 CM/S
VAS RIGHT ICA MID PSV: 82.2 CM/S
VAS RIGHT ICA PROX EDV: 19.3 CM/S
VAS RIGHT ICA PROX PSV: 71.1 CM/S
VAS RIGHT ICA/CCA PSV: 1.3 NO UNITS
VAS RIGHT VERTEBRAL EDV: 16.4 CM/S
VAS RIGHT VERTEBRAL PSV: 59.2 CM/S

## 2025-08-15 PROCEDURE — 93880 EXTRACRANIAL BILAT STUDY: CPT

## 2025-08-15 PROCEDURE — 93880 EXTRACRANIAL BILAT STUDY: CPT | Performed by: SURGERY
